# Patient Record
Sex: MALE | Race: BLACK OR AFRICAN AMERICAN | Employment: OTHER
[De-identification: names, ages, dates, MRNs, and addresses within clinical notes are randomized per-mention and may not be internally consistent; named-entity substitution may affect disease eponyms.]

---

## 2023-08-02 ENCOUNTER — APPOINTMENT (OUTPATIENT)
Facility: HOSPITAL | Age: 73
DRG: 871 | End: 2023-08-02
Payer: MEDICARE

## 2023-08-02 ENCOUNTER — HOSPITAL ENCOUNTER (INPATIENT)
Facility: HOSPITAL | Age: 73
LOS: 13 days | Discharge: NURSING, REHAB OR LONG-TERM CARE | DRG: 871 | End: 2023-08-15
Attending: STUDENT IN AN ORGANIZED HEALTH CARE EDUCATION/TRAINING PROGRAM | Admitting: HOSPITALIST
Payer: MEDICARE

## 2023-08-02 DIAGNOSIS — R91.8 LUNG MASS: ICD-10-CM

## 2023-08-02 DIAGNOSIS — J90 PLEURAL EFFUSION: ICD-10-CM

## 2023-08-02 DIAGNOSIS — A41.9 SEVERE SEPSIS (HCC): ICD-10-CM

## 2023-08-02 DIAGNOSIS — R65.20 SEVERE SEPSIS (HCC): ICD-10-CM

## 2023-08-02 DIAGNOSIS — I48.92 ATRIAL FLUTTER, UNSPECIFIED TYPE (HCC): Primary | ICD-10-CM

## 2023-08-02 LAB
ALBUMIN SERPL-MCNC: 2.3 G/DL (ref 3.5–5)
ALBUMIN/GLOB SERPL: 0.4 (ref 1.1–2.2)
ALP SERPL-CCNC: 133 U/L (ref 45–117)
ALT SERPL-CCNC: 13 U/L (ref 12–78)
ANION GAP SERPL CALC-SCNC: 8 MMOL/L (ref 5–15)
AST SERPL W P-5'-P-CCNC: 16 U/L (ref 15–37)
BASE EXCESS BLD CALC-SCNC: 2 MMOL/L
BASOPHILS # BLD: 0 K/UL (ref 0–0.1)
BASOPHILS NFR BLD: 0 % (ref 0–1)
BILIRUB SERPL-MCNC: 0.6 MG/DL (ref 0.2–1)
BNP SERPL-MCNC: 2075 PG/ML
BUN SERPL-MCNC: 15 MG/DL (ref 6–20)
BUN/CREAT SERPL: 19 (ref 12–20)
CA-I BLD-MCNC: 1.45 MMOL/L (ref 1.12–1.32)
CA-I BLD-MCNC: 11.4 MG/DL (ref 8.5–10.1)
CHLORIDE BLD-SCNC: 104 MMOL/L (ref 98–107)
CHLORIDE SERPL-SCNC: 104 MMOL/L (ref 97–108)
CO2 BLD-SCNC: 26 MMOL/L
CO2 SERPL-SCNC: 26 MMOL/L (ref 21–32)
CREAT SERPL-MCNC: 0.81 MG/DL (ref 0.7–1.3)
CREAT UR-MCNC: 0.49 MG/DL (ref 0.6–1.3)
DIFFERENTIAL METHOD BLD: ABNORMAL
EOSINOPHIL # BLD: 0.2 K/UL (ref 0–0.4)
EOSINOPHIL NFR BLD: 1 % (ref 0–7)
ERYTHROCYTE [DISTWIDTH] IN BLOOD BY AUTOMATED COUNT: 19.4 % (ref 11.5–14.5)
GLOBULIN SER CALC-MCNC: 5.5 G/DL (ref 2–4)
GLUCOSE BLD STRIP.AUTO-MCNC: 106 MG/DL (ref 65–100)
GLUCOSE SERPL-MCNC: 115 MG/DL (ref 65–100)
HCO3 BLD-SCNC: 25.7 MMOL/L (ref 19–28)
HCT VFR BLD AUTO: 36.4 % (ref 36.6–50.3)
HGB BLD-MCNC: 10.4 G/DL (ref 12.1–17)
IMM GRANULOCYTES # BLD AUTO: 0.2 K/UL (ref 0–0.04)
IMM GRANULOCYTES NFR BLD AUTO: 1 % (ref 0–0.5)
LACTATE BLD-SCNC: 1.71 MMOL/L (ref 0.4–2)
LACTATE BLD-SCNC: 2.92 MMOL/L (ref 0.4–2)
LYMPHOCYTES # BLD: 2 K/UL (ref 0.8–3.5)
LYMPHOCYTES NFR BLD: 11 % (ref 12–49)
MCH RBC QN AUTO: 20.5 PG (ref 26–34)
MCHC RBC AUTO-ENTMCNC: 28.6 G/DL (ref 30–36.5)
MCV RBC AUTO: 71.7 FL (ref 80–99)
MONOCYTES # BLD: 1.3 K/UL (ref 0–1)
MONOCYTES NFR BLD: 7 % (ref 5–13)
NEUTS SEG # BLD: 14.6 K/UL (ref 1.8–8)
NEUTS SEG NFR BLD: 80 % (ref 32–75)
NRBC # BLD: 0 K/UL (ref 0–0.01)
NRBC BLD-RTO: 0 PER 100 WBC
PCO2 BLD: 36.2 MMHG (ref 35–45)
PERFORMED BY:: ABNORMAL
PERFORMED BY:: NORMAL
PH BLD: 7.46 (ref 7.35–7.45)
PLATELET # BLD AUTO: 572 K/UL (ref 150–400)
PMV BLD AUTO: 9.5 FL (ref 8.9–12.9)
PO2 BLD: 25 MMHG (ref 75–100)
POTASSIUM BLD-SCNC: 4.1 MMOL/L (ref 3.5–5.5)
POTASSIUM SERPL-SCNC: 3.9 MMOL/L (ref 3.5–5.1)
PROCALCITONIN SERPL-MCNC: 0.07 NG/ML
PROT SERPL-MCNC: 7.8 G/DL (ref 6.4–8.2)
RBC # BLD AUTO: 5.08 M/UL (ref 4.1–5.7)
RBC MORPH BLD: ABNORMAL
SAO2 % BLD: 50 %
SARS-COV-2 RDRP RESP QL NAA+PROBE: NOT DETECTED
SERVICE CMNT-IMP: ABNORMAL
SODIUM BLD-SCNC: 137 MMOL/L (ref 136–145)
SODIUM SERPL-SCNC: 138 MMOL/L (ref 136–145)
SPECIMEN SITE: ABNORMAL
TROPONIN I SERPL HS-MCNC: 29 NG/L (ref 0–76)
WBC # BLD AUTO: 18.3 K/UL (ref 4.1–11.1)

## 2023-08-02 PROCEDURE — 96365 THER/PROPH/DIAG IV INF INIT: CPT

## 2023-08-02 PROCEDURE — 87635 SARS-COV-2 COVID-19 AMP PRB: CPT

## 2023-08-02 PROCEDURE — 80307 DRUG TEST PRSMV CHEM ANLYZR: CPT

## 2023-08-02 PROCEDURE — 84132 ASSAY OF SERUM POTASSIUM: CPT

## 2023-08-02 PROCEDURE — 36415 COLL VENOUS BLD VENIPUNCTURE: CPT

## 2023-08-02 PROCEDURE — 2580000003 HC RX 258: Performed by: HOSPITALIST

## 2023-08-02 PROCEDURE — 87040 BLOOD CULTURE FOR BACTERIA: CPT

## 2023-08-02 PROCEDURE — 2580000003 HC RX 258: Performed by: STUDENT IN AN ORGANIZED HEALTH CARE EDUCATION/TRAINING PROGRAM

## 2023-08-02 PROCEDURE — 80053 COMPREHEN METABOLIC PANEL: CPT

## 2023-08-02 PROCEDURE — 2000000000 HC ICU R&B

## 2023-08-02 PROCEDURE — 6370000000 HC RX 637 (ALT 250 FOR IP): Performed by: STUDENT IN AN ORGANIZED HEALTH CARE EDUCATION/TRAINING PROGRAM

## 2023-08-02 PROCEDURE — 84484 ASSAY OF TROPONIN QUANT: CPT

## 2023-08-02 PROCEDURE — 82803 BLOOD GASES ANY COMBINATION: CPT

## 2023-08-02 PROCEDURE — 82330 ASSAY OF CALCIUM: CPT

## 2023-08-02 PROCEDURE — 70450 CT HEAD/BRAIN W/O DYE: CPT

## 2023-08-02 PROCEDURE — 85025 COMPLETE CBC W/AUTO DIFF WBC: CPT

## 2023-08-02 PROCEDURE — 84295 ASSAY OF SERUM SODIUM: CPT

## 2023-08-02 PROCEDURE — 96375 TX/PRO/DX INJ NEW DRUG ADDON: CPT

## 2023-08-02 PROCEDURE — 93005 ELECTROCARDIOGRAM TRACING: CPT | Performed by: STUDENT IN AN ORGANIZED HEALTH CARE EDUCATION/TRAINING PROGRAM

## 2023-08-02 PROCEDURE — 6360000002 HC RX W HCPCS: Performed by: STUDENT IN AN ORGANIZED HEALTH CARE EDUCATION/TRAINING PROGRAM

## 2023-08-02 PROCEDURE — 81001 URINALYSIS AUTO W/SCOPE: CPT

## 2023-08-02 PROCEDURE — 83880 ASSAY OF NATRIURETIC PEPTIDE: CPT

## 2023-08-02 PROCEDURE — 83605 ASSAY OF LACTIC ACID: CPT

## 2023-08-02 PROCEDURE — 71045 X-RAY EXAM CHEST 1 VIEW: CPT

## 2023-08-02 PROCEDURE — 2500000003 HC RX 250 WO HCPCS: Performed by: STUDENT IN AN ORGANIZED HEALTH CARE EDUCATION/TRAINING PROGRAM

## 2023-08-02 PROCEDURE — 99285 EMERGENCY DEPT VISIT HI MDM: CPT

## 2023-08-02 PROCEDURE — 96361 HYDRATE IV INFUSION ADD-ON: CPT

## 2023-08-02 PROCEDURE — 84145 PROCALCITONIN (PCT): CPT

## 2023-08-02 PROCEDURE — 82947 ASSAY GLUCOSE BLOOD QUANT: CPT

## 2023-08-02 PROCEDURE — 71250 CT THORAX DX C-: CPT

## 2023-08-02 RX ORDER — ENOXAPARIN SODIUM 100 MG/ML
40 INJECTION SUBCUTANEOUS DAILY
Status: DISCONTINUED | OUTPATIENT
Start: 2023-08-03 | End: 2023-08-03

## 2023-08-02 RX ORDER — SODIUM CHLORIDE 9 MG/ML
INJECTION, SOLUTION INTRAVENOUS CONTINUOUS
Status: DISPENSED | OUTPATIENT
Start: 2023-08-02 | End: 2023-08-03

## 2023-08-02 RX ORDER — DILTIAZEM HYDROCHLORIDE 5 MG/ML
0.25 INJECTION INTRAVENOUS ONCE
Status: DISCONTINUED | OUTPATIENT
Start: 2023-08-02 | End: 2023-08-02

## 2023-08-02 RX ORDER — DILTIAZEM HYDROCHLORIDE 5 MG/ML
0.25 INJECTION INTRAVENOUS ONCE
Status: COMPLETED | OUTPATIENT
Start: 2023-08-02 | End: 2023-08-02

## 2023-08-02 RX ORDER — ACETAMINOPHEN 650 MG/1
650 SUPPOSITORY RECTAL EVERY 6 HOURS PRN
Status: DISCONTINUED | OUTPATIENT
Start: 2023-08-02 | End: 2023-08-15 | Stop reason: HOSPADM

## 2023-08-02 RX ORDER — 0.9 % SODIUM CHLORIDE 0.9 %
1000 INTRAVENOUS SOLUTION INTRAVENOUS ONCE
Status: COMPLETED | OUTPATIENT
Start: 2023-08-02 | End: 2023-08-02

## 2023-08-02 RX ORDER — ACETAMINOPHEN 325 MG/1
650 TABLET ORAL EVERY 6 HOURS PRN
Status: DISCONTINUED | OUTPATIENT
Start: 2023-08-02 | End: 2023-08-15 | Stop reason: HOSPADM

## 2023-08-02 RX ORDER — ADENOSINE 3 MG/ML
6 INJECTION, SOLUTION INTRAVENOUS ONCE
Status: COMPLETED | OUTPATIENT
Start: 2023-08-02 | End: 2023-08-02

## 2023-08-02 RX ADMIN — PIPERACILLIN AND TAZOBACTAM 4500 MG: 4; .5 INJECTION, POWDER, LYOPHILIZED, FOR SOLUTION INTRAVENOUS at 19:24

## 2023-08-02 RX ADMIN — ADENOSINE 6 MG: 3 INJECTION INTRAVENOUS at 16:38

## 2023-08-02 RX ADMIN — SODIUM CHLORIDE: 9 INJECTION, SOLUTION INTRAVENOUS at 23:12

## 2023-08-02 RX ADMIN — SODIUM CHLORIDE 1000 ML: 9 INJECTION, SOLUTION INTRAVENOUS at 17:00

## 2023-08-02 RX ADMIN — DILTIAZEM HYDROCHLORIDE 20.5 MG: 5 INJECTION INTRAVENOUS at 16:45

## 2023-08-02 RX ADMIN — DILTIAZEM HYDROCHLORIDE 60 MG: 30 TABLET, FILM COATED ORAL at 16:51

## 2023-08-02 RX ADMIN — VANCOMYCIN HYDROCHLORIDE 2000 MG: 1 INJECTION, POWDER, LYOPHILIZED, FOR SOLUTION INTRAVENOUS at 19:58

## 2023-08-02 RX ADMIN — DILTIAZEM HYDROCHLORIDE 2.5 MG/HR: 5 INJECTION, SOLUTION INTRAVENOUS at 22:02

## 2023-08-02 ASSESSMENT — PAIN - FUNCTIONAL ASSESSMENT: PAIN_FUNCTIONAL_ASSESSMENT: NONE - DENIES PAIN

## 2023-08-02 ASSESSMENT — LIFESTYLE VARIABLES
HOW OFTEN DO YOU HAVE A DRINK CONTAINING ALCOHOL: NEVER
HOW MANY STANDARD DRINKS CONTAINING ALCOHOL DO YOU HAVE ON A TYPICAL DAY: PATIENT DOES NOT DRINK

## 2023-08-02 NOTE — ED TRIAGE NOTES
Patient arrives via ems from home after family took out an ECO, patients only complaint is weakness. Family states that patient has been acting oddly for the past week, walking around naked or in soiled clothes.  Ems reported a HR of 160

## 2023-08-02 NOTE — ED PROVIDER NOTES
EMERGENCY DEPARTMENT HISTORY AND PHYSICAL EXAM      Date: 8/2/2023  Patient Name: Bella Montague  MRN: [id number]  [facility], [place]: 1950  Date of evaluation: 8/2/2023  Provider: Marleni Purvis MD     History of Present Illness     Chief Complaint   Patient presents with    Altered Mental Status       History Provided By: Patient    HPI: Bella Montague, 67 y.o. male with past medical history as listed and reviewed below with no known medical history at this time presenting to the ED for altered mental status. Per EMS the patient was put under ECO for altered mental status over the last few days. This was done by the family. Patient denies any acute complaints at this time, denies any chest pain abdominal pain or back pain. Medical History     Past Medical History:  History reviewed. No pertinent past medical history. Past Surgical History:  History reviewed. No pertinent surgical history. Family History:  Family History   Family history unknown: Yes       Social History:  Social History     Tobacco Use    Smoking status: Every Day     Types: Cigarettes    Smokeless tobacco: Never   Substance Use Topics    Alcohol use: Never       Allergies:  No Known Allergies    PCP: None None    Current Medications:   Previous Medications    No medications on file       Review of Systems     Positives and Pertinent negatives as per HPI. Physical Exam     Vitals:  I reviewed the patient's vital signs  Vitals:    08/02/23 2100 08/02/23 2130 08/02/23 2147 08/02/23 2152   BP: 113/75 110/77 110/83 103/79   Pulse: (!) 119 (!) 139 (!) 162 (!) 149   Resp: 17 19 26 25   Temp:       TempSrc:       SpO2: 99% 98%     Weight:       Height:           Physical Exam  Vitals and nursing note reviewed. Constitutional:       Appearance: He is ill-appearing. HENT:      Head: Normocephalic.       Nose: Nose normal.      Mouth/Throat:      Mouth: Mucous membranes are moist.   Eyes:      Extraocular Movements: Extraocular movements

## 2023-08-03 ENCOUNTER — APPOINTMENT (OUTPATIENT)
Facility: HOSPITAL | Age: 73
DRG: 871 | End: 2023-08-03
Payer: MEDICARE

## 2023-08-03 ENCOUNTER — APPOINTMENT (OUTPATIENT)
Facility: HOSPITAL | Age: 73
DRG: 871 | End: 2023-08-03
Attending: HOSPITALIST
Payer: MEDICARE

## 2023-08-03 ENCOUNTER — APPOINTMENT (OUTPATIENT)
Facility: HOSPITAL | Age: 73
DRG: 871 | End: 2023-08-03
Attending: INTERNAL MEDICINE
Payer: MEDICARE

## 2023-08-03 LAB
25(OH)D3 SERPL-MCNC: 13.2 NG/ML (ref 30–100)
ALBUMIN SERPL-MCNC: 2 G/DL (ref 3.5–5)
ALBUMIN/GLOB SERPL: 0.4 (ref 1.1–2.2)
ALP SERPL-CCNC: 100 U/L (ref 45–117)
ALT SERPL-CCNC: 10 U/L (ref 12–78)
AMPHET UR QL SCN: NEGATIVE
ANION GAP SERPL CALC-SCNC: 6 MMOL/L (ref 5–15)
APPEARANCE FLD: ABNORMAL
APPEARANCE UR: ABNORMAL
AST SERPL W P-5'-P-CCNC: 20 U/L (ref 15–37)
BACTERIA URNS QL MICRO: NEGATIVE /HPF
BARBITURATES UR QL SCN: NEGATIVE
BASOPHILS # BLD: 0 K/UL (ref 0–0.1)
BASOPHILS NFR BLD: 0 % (ref 0–1)
BENZODIAZ UR QL: NEGATIVE
BILIRUB SERPL-MCNC: 0.6 MG/DL (ref 0.2–1)
BILIRUB UR QL: NEGATIVE
BODY FLD TYPE: NORMAL
BUN SERPL-MCNC: 14 MG/DL (ref 6–20)
BUN/CREAT SERPL: 17 (ref 12–20)
CA-I BLD-MCNC: 10.6 MG/DL (ref 8.5–10.1)
CA-I BLD-MCNC: 10.7 MG/DL (ref 8.5–10.1)
CANNABINOIDS UR QL SCN: POSITIVE
CHLORIDE SERPL-SCNC: 109 MMOL/L (ref 97–108)
CK SERPL-CCNC: 40 U/L (ref 39–308)
CO2 SERPL-SCNC: 25 MMOL/L (ref 21–32)
COCAINE UR QL SCN: NEGATIVE
COLOR FLD: ABNORMAL
COLOR UR: YELLOW
CREAT SERPL-MCNC: 0.82 MG/DL (ref 0.7–1.3)
CRP SERPL-MCNC: 8.09 MG/DL (ref 0–0.6)
DIFFERENTIAL METHOD BLD: ABNORMAL
EKG ATRIAL RATE: 167 BPM
EKG ATRIAL RATE: 167 BPM
EKG ATRIAL RATE: 322 BPM
EKG ATRIAL RATE: 332 BPM
EKG DIAGNOSIS: NORMAL
EKG P AXIS: 203 DEGREES
EKG P AXIS: 96 DEGREES
EKG Q-T INTERVAL: 268 MS
EKG Q-T INTERVAL: 278 MS
EKG Q-T INTERVAL: 282 MS
EKG Q-T INTERVAL: 286 MS
EKG QRS DURATION: 114 MS
EKG QRS DURATION: 66 MS
EKG QRS DURATION: 72 MS
EKG QRS DURATION: 80 MS
EKG QTC CALCULATION (BAZETT): 438 MS
EKG QTC CALCULATION (BAZETT): 463 MS
EKG QTC CALCULATION (BAZETT): 468 MS
EKG QTC CALCULATION (BAZETT): 477 MS
EKG R AXIS: 12 DEGREES
EKG R AXIS: 16 DEGREES
EKG R AXIS: 19 DEGREES
EKG R AXIS: 26 DEGREES
EKG T AXIS: -83 DEGREES
EKG T AXIS: 234 DEGREES
EKG T AXIS: 247 DEGREES
EKG T AXIS: 264 DEGREES
EKG VENTRICULAR RATE: 161 BPM
EKG VENTRICULAR RATE: 166 BPM
EKG VENTRICULAR RATE: 167 BPM
EKG VENTRICULAR RATE: 167 BPM
EOSINOPHIL # BLD: 0 K/UL (ref 0–0.4)
EOSINOPHIL NFR BLD: 0 % (ref 0–7)
EOSINOPHIL NFR FLD MANUAL: 0 %
ERYTHROCYTE [DISTWIDTH] IN BLOOD BY AUTOMATED COUNT: 18.9 % (ref 11.5–14.5)
GLOBULIN SER CALC-MCNC: 4.8 G/DL (ref 2–4)
GLUCOSE FLD-MCNC: 53 MG/DL
GLUCOSE SERPL-MCNC: 98 MG/DL (ref 65–100)
GLUCOSE UR STRIP.AUTO-MCNC: NEGATIVE MG/DL
HCT VFR BLD AUTO: 34.7 % (ref 36.6–50.3)
HGB BLD-MCNC: 9.9 G/DL (ref 12.1–17)
HGB UR QL STRIP: NEGATIVE
IMM GRANULOCYTES # BLD AUTO: 0.2 K/UL (ref 0–0.04)
IMM GRANULOCYTES NFR BLD AUTO: 1 % (ref 0–0.5)
KETONES UR QL STRIP.AUTO: NEGATIVE MG/DL
LDH FLD L TO P-CCNC: 359 U/L
LEUKOCYTE ESTERASE UR QL STRIP.AUTO: NEGATIVE
LYMPHOCYTES # BLD: 1.5 K/UL (ref 0.8–3.5)
LYMPHOCYTES NFR BLD: 8 % (ref 12–49)
LYMPHOCYTES NFR FLD: 48 %
Lab: ABNORMAL
MCH RBC QN AUTO: 20.7 PG (ref 26–34)
MCHC RBC AUTO-ENTMCNC: 28.5 G/DL (ref 30–36.5)
MCV RBC AUTO: 72.6 FL (ref 80–99)
MESOTHL CELL NFR FLD: 7 %
METHADONE UR QL: NEGATIVE
MONOCYTES # BLD: 1.1 K/UL (ref 0–1)
MONOCYTES NFR BLD: 6 % (ref 5–13)
MONOCYTES NFR FLD: 3 %
MRSA DNA SPEC QL NAA+PROBE: NOT DETECTED
MUCOUS THREADS URNS QL MICRO: ABNORMAL /LPF
NEUTROPHILS NFR FLD: 42 %
NEUTS BAND # FLD: 0 %
NEUTS SEG # BLD: 15.4 K/UL (ref 1.8–8)
NEUTS SEG NFR BLD: 85 % (ref 32–75)
NITRITE UR QL STRIP.AUTO: NEGATIVE
NRBC # BLD: 0 K/UL (ref 0–0.01)
NRBC BLD-RTO: 0 PER 100 WBC
NUC CELL # FLD: 2123 /CU MM
NUC CELL # FLD: 2123 /CU MM (ref 0–5)
OPIATES UR QL: NEGATIVE
PCP UR QL: NEGATIVE
PH FLD: 8
PH UR STRIP: 5 (ref 5–8)
PLATELET # BLD AUTO: 504 K/UL (ref 150–400)
PMV BLD AUTO: 9.7 FL (ref 8.9–12.9)
POTASSIUM SERPL-SCNC: 4.5 MMOL/L (ref 3.5–5.1)
PROCALCITONIN SERPL-MCNC: 0.07 NG/ML
PROT FLD-MCNC: 3 G/DL
PROT SERPL-MCNC: 6.8 G/DL (ref 6.4–8.2)
PROT UR STRIP-MCNC: NEGATIVE MG/DL
PSA SERPL-MCNC: 5.3 NG/ML (ref 0.01–4)
PTH-INTACT SERPL-MCNC: 9.3 PG/ML (ref 18.4–88)
RBC # BLD AUTO: 4.78 M/UL (ref 4.1–5.7)
RBC # FLD: >100 /CU MM
RBC #/AREA URNS HPF: ABNORMAL /HPF (ref 0–5)
RBC MORPH BLD: ABNORMAL
SODIUM SERPL-SCNC: 140 MMOL/L (ref 136–145)
SP GR UR REFRACTOMETRY: 1.03 (ref 1–1.03)
SPECIMEN SOURCE FLD: ABNORMAL
SPECIMEN SOURCE FLD: NORMAL
TSH SERPL DL<=0.05 MIU/L-ACNC: 2.12 UIU/ML (ref 0.36–3.74)
URATE SERPL-MCNC: 4.9 MG/DL (ref 3.5–7.2)
URINE CULTURE IF INDICATED: ABNORMAL
UROBILINOGEN UR QL STRIP.AUTO: 4 EU/DL (ref 0.1–1)
WBC # BLD AUTO: 18.2 K/UL (ref 4.1–11.1)
WBC URNS QL MICRO: ABNORMAL /HPF (ref 0–4)

## 2023-08-03 PROCEDURE — 85025 COMPLETE CBC W/AUTO DIFF WBC: CPT

## 2023-08-03 PROCEDURE — 6360000002 HC RX W HCPCS: Performed by: HOSPITALIST

## 2023-08-03 PROCEDURE — 02HV33Z INSERTION OF INFUSION DEVICE INTO SUPERIOR VENA CAVA, PERCUTANEOUS APPROACH: ICD-10-PCS | Performed by: STUDENT IN AN ORGANIZED HEALTH CARE EDUCATION/TRAINING PROGRAM

## 2023-08-03 PROCEDURE — 82945 GLUCOSE OTHER FLUID: CPT

## 2023-08-03 PROCEDURE — 6360000004 HC RX CONTRAST MEDICATION: Performed by: INTERNAL MEDICINE

## 2023-08-03 PROCEDURE — 84443 ASSAY THYROID STIM HORMONE: CPT

## 2023-08-03 PROCEDURE — 82550 ASSAY OF CK (CPK): CPT

## 2023-08-03 PROCEDURE — 87205 SMEAR GRAM STAIN: CPT

## 2023-08-03 PROCEDURE — A4216 STERILE WATER/SALINE, 10 ML: HCPCS | Performed by: INTERNAL MEDICINE

## 2023-08-03 PROCEDURE — 87070 CULTURE OTHR SPECIMN AEROBIC: CPT

## 2023-08-03 PROCEDURE — 86140 C-REACTIVE PROTEIN: CPT

## 2023-08-03 PROCEDURE — 84153 ASSAY OF PSA TOTAL: CPT

## 2023-08-03 PROCEDURE — 83970 ASSAY OF PARATHORMONE: CPT

## 2023-08-03 PROCEDURE — 82306 VITAMIN D 25 HYDROXY: CPT

## 2023-08-03 PROCEDURE — 84157 ASSAY OF PROTEIN OTHER: CPT

## 2023-08-03 PROCEDURE — C1894 INTRO/SHEATH, NON-LASER: HCPCS

## 2023-08-03 PROCEDURE — 80053 COMPREHEN METABOLIC PANEL: CPT

## 2023-08-03 PROCEDURE — 89051 BODY FLUID CELL COUNT: CPT

## 2023-08-03 PROCEDURE — 2500000003 HC RX 250 WO HCPCS: Performed by: STUDENT IN AN ORGANIZED HEALTH CARE EDUCATION/TRAINING PROGRAM

## 2023-08-03 PROCEDURE — 83615 LACTATE (LD) (LDH) ENZYME: CPT

## 2023-08-03 PROCEDURE — 76942 ECHO GUIDE FOR BIOPSY: CPT

## 2023-08-03 PROCEDURE — 84145 PROCALCITONIN (PCT): CPT

## 2023-08-03 PROCEDURE — 0W9B3ZZ DRAINAGE OF LEFT PLEURAL CAVITY, PERCUTANEOUS APPROACH: ICD-10-PCS | Performed by: INTERNAL MEDICINE

## 2023-08-03 PROCEDURE — C8929 TTE W OR WO FOL WCON,DOPPLER: HCPCS

## 2023-08-03 PROCEDURE — 87641 MR-STAPH DNA AMP PROBE: CPT

## 2023-08-03 PROCEDURE — 84550 ASSAY OF BLOOD/URIC ACID: CPT

## 2023-08-03 PROCEDURE — 2000000000 HC ICU R&B

## 2023-08-03 PROCEDURE — C1729 CATH, DRAINAGE: HCPCS

## 2023-08-03 PROCEDURE — 2580000003 HC RX 258: Performed by: HOSPITALIST

## 2023-08-03 PROCEDURE — 2580000003 HC RX 258: Performed by: STUDENT IN AN ORGANIZED HEALTH CARE EDUCATION/TRAINING PROGRAM

## 2023-08-03 PROCEDURE — 88305 TISSUE EXAM BY PATHOLOGIST: CPT

## 2023-08-03 PROCEDURE — 71045 X-RAY EXAM CHEST 1 VIEW: CPT

## 2023-08-03 PROCEDURE — 88341 IMHCHEM/IMCYTCHM EA ADD ANTB: CPT

## 2023-08-03 PROCEDURE — 88112 CYTOPATH CELL ENHANCE TECH: CPT

## 2023-08-03 PROCEDURE — 2580000003 HC RX 258: Performed by: INTERNAL MEDICINE

## 2023-08-03 PROCEDURE — 83986 ASSAY PH BODY FLUID NOS: CPT

## 2023-08-03 PROCEDURE — 88342 IMHCHEM/IMCYTCHM 1ST ANTB: CPT

## 2023-08-03 RX ORDER — 0.9 % SODIUM CHLORIDE 0.9 %
1000 INTRAVENOUS SOLUTION INTRAVENOUS ONCE
Status: COMPLETED | OUTPATIENT
Start: 2023-08-03 | End: 2023-08-03

## 2023-08-03 RX ORDER — ENOXAPARIN SODIUM 100 MG/ML
40 INJECTION SUBCUTANEOUS DAILY
Status: DISCONTINUED | OUTPATIENT
Start: 2023-08-04 | End: 2023-08-11

## 2023-08-03 RX ORDER — SODIUM CHLORIDE 0.9 % (FLUSH) 0.9 %
5-40 SYRINGE (ML) INJECTION PRN
Status: DISCONTINUED | OUTPATIENT
Start: 2023-08-03 | End: 2023-08-15 | Stop reason: HOSPADM

## 2023-08-03 RX ORDER — SODIUM CHLORIDE 0.9 % (FLUSH) 0.9 %
5-40 SYRINGE (ML) INJECTION EVERY 12 HOURS SCHEDULED
Status: DISCONTINUED | OUTPATIENT
Start: 2023-08-03 | End: 2023-08-15 | Stop reason: HOSPADM

## 2023-08-03 RX ORDER — SODIUM CHLORIDE 9 MG/ML
INJECTION, SOLUTION INTRAVENOUS PRN
Status: DISCONTINUED | OUTPATIENT
Start: 2023-08-03 | End: 2023-08-15 | Stop reason: HOSPADM

## 2023-08-03 RX ADMIN — SODIUM CHLORIDE, PRESERVATIVE FREE 10 ML: 5 INJECTION INTRAVENOUS at 21:00

## 2023-08-03 RX ADMIN — PERFLUTREN 2 ML: 6.52 INJECTION, SUSPENSION INTRAVENOUS at 16:38

## 2023-08-03 RX ADMIN — SODIUM CHLORIDE: 9 INJECTION, SOLUTION INTRAVENOUS at 09:06

## 2023-08-03 RX ADMIN — PIPERACILLIN SODIUM AND TAZOBACTAM SODIUM 3375 MG: 3; .375 INJECTION, POWDER, LYOPHILIZED, FOR SOLUTION INTRAVENOUS at 09:25

## 2023-08-03 RX ADMIN — SODIUM CHLORIDE 1000 ML: 9 INJECTION, SOLUTION INTRAVENOUS at 01:55

## 2023-08-03 RX ADMIN — PIPERACILLIN SODIUM AND TAZOBACTAM SODIUM 3375 MG: 3; .375 INJECTION, POWDER, LYOPHILIZED, FOR SOLUTION INTRAVENOUS at 17:00

## 2023-08-03 RX ADMIN — PIPERACILLIN SODIUM AND TAZOBACTAM SODIUM 3375 MG: 3; .375 INJECTION, POWDER, LYOPHILIZED, FOR SOLUTION INTRAVENOUS at 02:01

## 2023-08-03 RX ADMIN — DILTIAZEM HYDROCHLORIDE 10 MG/HR: 5 INJECTION, SOLUTION INTRAVENOUS at 15:57

## 2023-08-03 RX ADMIN — SODIUM CHLORIDE, PRESERVATIVE FREE 10 ML: 5 INJECTION INTRAVENOUS at 09:25

## 2023-08-03 ASSESSMENT — PAIN SCALES - GENERAL
PAINLEVEL_OUTOF10: 0
PAINLEVEL_OUTOF10: 0

## 2023-08-03 NOTE — CARE COORDINATION
8/3/23 AMIRA attempted to contact oliva Garcia @ [phone]) - phone went straight to  & unable to leave message - not set up.

## 2023-08-03 NOTE — ED NOTES
Patient HR increased to 160s, BP stable, repeat EKG preformed and ED MD at bedside.  Hospitalist called and orders placed for ivy Landry RN  08/02/23 0802

## 2023-08-03 NOTE — ED NOTES
D19 contacted to inquire about the ECO and was informed that it was released due to the patient being treated for medical issues.  Prescreen is being emailed to [facility] at this time     Balaji Smith RN  08/02/23 2031

## 2023-08-03 NOTE — CARE COORDINATION
Multiple outbound calls to patient's oliva Tyler @ [phone]. No answer and unable to leave a  d/t mailbox not set up. Assigned RN informed writer patient is supposed to have a thoracentesis today. Patient is Aox1 to himself. Was able to inform RN he has two grown kids. Daughter's aren't listed in his chart; and there is no supporting documentation for a POA or Advance Medical Directive. Will continue to try and reach the oliva Tyler) @ [phone].        Heath Lopez, MSBRENDON

## 2023-08-04 ENCOUNTER — APPOINTMENT (OUTPATIENT)
Facility: HOSPITAL | Age: 73
DRG: 871 | End: 2023-08-04
Payer: MEDICARE

## 2023-08-04 LAB
ALBUMIN SERPL-MCNC: 1.9 G/DL (ref 3.5–5)
ALBUMIN/GLOB SERPL: 0.4 (ref 1.1–2.2)
ALP SERPL-CCNC: 96 U/L (ref 45–117)
ALT SERPL-CCNC: 11 U/L (ref 12–78)
ANION GAP SERPL CALC-SCNC: 7 MMOL/L (ref 5–15)
ARTERIAL PATENCY WRIST A: YES
AST SERPL W P-5'-P-CCNC: 13 U/L (ref 15–37)
BASE DEFICIT BLDA-SCNC: 1.2 MMOL/L
BASOPHILS # BLD: 0 K/UL (ref 0–0.1)
BASOPHILS NFR BLD: 0 % (ref 0–1)
BDY SITE: ABNORMAL
BILIRUB SERPL-MCNC: 0.6 MG/DL (ref 0.2–1)
BODY TEMPERATURE: 97.2
BUN SERPL-MCNC: 16 MG/DL (ref 6–20)
BUN/CREAT SERPL: 20 (ref 12–20)
CA-I BLD-MCNC: 10.7 MG/DL (ref 8.5–10.1)
CHLORIDE SERPL-SCNC: 109 MMOL/L (ref 97–108)
CO2 SERPL-SCNC: 24 MMOL/L (ref 21–32)
COHGB MFR BLD: 0.8 % (ref 1–2)
CREAT SERPL-MCNC: 0.8 MG/DL (ref 0.7–1.3)
DIFFERENTIAL METHOD BLD: ABNORMAL
ECHO AO ASC DIAM: 2.8 CM
ECHO AO ASCENDING AORTA INDEX: 1.37 CM/M2
ECHO AO ROOT DIAM: 3.1 CM
ECHO AO ROOT INDEX: 1.52 CM/M2
ECHO AV AREA PEAK VELOCITY: 1.8 CM2
ECHO AV AREA VTI: 1.9 CM2
ECHO AV AREA/BSA PEAK VELOCITY: 0.9 CM2/M2
ECHO AV AREA/BSA VTI: 0.9 CM2/M2
ECHO AV MEAN GRADIENT: 6 MMHG
ECHO AV MEAN VELOCITY: 1.1 M/S
ECHO AV PEAK GRADIENT: 7 MMHG
ECHO AV PEAK VELOCITY: 1.3 M/S
ECHO AV VELOCITY RATIO: 0.54
ECHO AV VTI: 19.8 CM
ECHO BSA: 2.04 M2
ECHO LA AREA 2C: 15.8 CM2
ECHO LA AREA 4C: 5.3 CM2
ECHO LA DIAMETER INDEX: 0.93 CM/M2
ECHO LA DIAMETER: 1.9 CM
ECHO LA MAJOR AXIS: 3.6 CM
ECHO LA MINOR AXIS: 4.4 CM
ECHO LA TO AORTIC ROOT RATIO: 0.61
ECHO LA VOL 2C: 46 ML (ref 18–58)
ECHO LA VOL 4C: 6 ML (ref 18–58)
ECHO LA VOL BP: 18 ML (ref 18–58)
ECHO LA VOL/BSA BIPLANE: 9 ML/M2 (ref 16–34)
ECHO LA VOLUME INDEX A2C: 23 ML/M2 (ref 16–34)
ECHO LA VOLUME INDEX A4C: 3 ML/M2 (ref 16–34)
ECHO LV E' LATERAL VELOCITY: 4 CM/S
ECHO LV E' SEPTAL VELOCITY: 4 CM/S
ECHO LV EDV A2C: 69 ML
ECHO LV EDV A4C: 47 ML
ECHO LV EDV INDEX A4C: 23 ML/M2
ECHO LV EDV NDEX A2C: 34 ML/M2
ECHO LV EJECTION FRACTION A2C: 45 %
ECHO LV EJECTION FRACTION A4C: 44 %
ECHO LV EJECTION FRACTION BIPLANE: 47 % (ref 55–100)
ECHO LV ESV A2C: 38 ML
ECHO LV ESV A4C: 26 ML
ECHO LV ESV INDEX A2C: 19 ML/M2
ECHO LV ESV INDEX A4C: 13 ML/M2
ECHO LV FRACTIONAL SHORTENING: 16 % (ref 28–44)
ECHO LV INTERNAL DIMENSION DIASTOLE INDEX: 1.23 CM/M2
ECHO LV INTERNAL DIMENSION DIASTOLIC: 2.5 CM (ref 4.2–5.9)
ECHO LV INTERNAL DIMENSION SYSTOLIC INDEX: 1.03 CM/M2
ECHO LV INTERNAL DIMENSION SYSTOLIC: 2.1 CM
ECHO LV IVSD: 0.9 CM (ref 0.6–1)
ECHO LV MASS 2D: 53.7 G (ref 88–224)
ECHO LV MASS INDEX 2D: 26.3 G/M2 (ref 49–115)
ECHO LV POSTERIOR WALL DIASTOLIC: 0.9 CM (ref 0.6–1)
ECHO LV RELATIVE WALL THICKNESS RATIO: 0.72
ECHO LVOT AREA: 3.5 CM2
ECHO LVOT AV VTI INDEX: 0.55
ECHO LVOT DIAM: 2.1 CM
ECHO LVOT MEAN GRADIENT: 1 MMHG
ECHO LVOT PEAK GRADIENT: 2 MMHG
ECHO LVOT PEAK VELOCITY: 0.7 M/S
ECHO LVOT STROKE VOLUME INDEX: 18.3 ML/M2
ECHO LVOT SV: 37.4 ML
ECHO LVOT VTI: 10.8 CM
ECHO MV AREA VTI: 2.2 CM2
ECHO MV LVOT VTI INDEX: 1.6
ECHO MV MAX VELOCITY: 0.8 M/S
ECHO MV MEAN GRADIENT: 2 MMHG
ECHO MV MEAN VELOCITY: 0.7 M/S
ECHO MV PEAK GRADIENT: 3 MMHG
ECHO MV VTI: 17.3 CM
ECHO PV MAX VELOCITY: 0.5 M/S
ECHO PV PEAK GRADIENT: 1 MMHG
ECHO RA AREA 4C: 11.3 CM2
ECHO RA END SYSTOLIC VOLUME APICAL 4 CHAMBER INDEX BSA: 11 ML/M2
ECHO RA VOLUME: 22 ML
ECHO RV BASAL DIMENSION: 2.5 CM
ECHO RV TAPSE: 1.5 CM (ref 1.7–?)
ECHO TV REGURGITANT MAX VELOCITY: 2.79 M/S
ECHO TV REGURGITANT PEAK GRADIENT: 31 MMHG
EOSINOPHIL # BLD: 0.1 K/UL (ref 0–0.4)
EOSINOPHIL NFR BLD: 0 % (ref 0–7)
ERYTHROCYTE [DISTWIDTH] IN BLOOD BY AUTOMATED COUNT: 18.7 % (ref 11.5–14.5)
FIO2 ON VENT: 21 %
GLOBULIN SER CALC-MCNC: 4.6 G/DL (ref 2–4)
GLUCOSE SERPL-MCNC: 103 MG/DL (ref 65–100)
HCO3 BLDA-SCNC: 23 MMOL/L (ref 22–26)
HCT VFR BLD AUTO: 30.9 % (ref 36.6–50.3)
HGB BLD-MCNC: 9.2 G/DL (ref 12.1–17)
IMM GRANULOCYTES # BLD AUTO: 0.2 K/UL (ref 0–0.04)
IMM GRANULOCYTES NFR BLD AUTO: 1 % (ref 0–0.5)
LYMPHOCYTES # BLD: 2 K/UL (ref 0.8–3.5)
LYMPHOCYTES NFR BLD: 10 % (ref 12–49)
MCH RBC QN AUTO: 21.1 PG (ref 26–34)
MCHC RBC AUTO-ENTMCNC: 29.8 G/DL (ref 30–36.5)
MCV RBC AUTO: 70.9 FL (ref 80–99)
METHGB MFR BLD: 0.3 % (ref 0–1.4)
MONOCYTES # BLD: 1.3 K/UL (ref 0–1)
MONOCYTES NFR BLD: 7 % (ref 5–13)
NEUTS SEG # BLD: 15.6 K/UL (ref 1.8–8)
NEUTS SEG NFR BLD: 82 % (ref 32–75)
NRBC # BLD: 0 K/UL (ref 0–0.01)
NRBC BLD-RTO: 0 PER 100 WBC
OXYHGB MFR BLD: 94.8 % (ref 95–99)
PCO2 BLDA: 33 MMHG (ref 35–45)
PERFORMED BY:: ABNORMAL
PH BLDA: 7.45 (ref 7.35–7.45)
PLATELET # BLD AUTO: 454 K/UL (ref 150–400)
PMV BLD AUTO: 9.8 FL (ref 8.9–12.9)
PO2 BLDA: 78 MMHG (ref 80–100)
POTASSIUM SERPL-SCNC: 3.7 MMOL/L (ref 3.5–5.1)
PROCALCITONIN SERPL-MCNC: 0.07 NG/ML
PROT SERPL-MCNC: 6.5 G/DL (ref 6.4–8.2)
RBC # BLD AUTO: 4.36 M/UL (ref 4.1–5.7)
SAO2 % BLD: 96 % (ref 95–99)
SAO2% DEVICE SAO2% SENSOR NAME: ABNORMAL
SODIUM SERPL-SCNC: 140 MMOL/L (ref 136–145)
SPECIMEN SITE: ABNORMAL
WBC # BLD AUTO: 19.1 K/UL (ref 4.1–11.1)

## 2023-08-04 PROCEDURE — 2580000003 HC RX 258: Performed by: STUDENT IN AN ORGANIZED HEALTH CARE EDUCATION/TRAINING PROGRAM

## 2023-08-04 PROCEDURE — 6360000002 HC RX W HCPCS: Performed by: INTERNAL MEDICINE

## 2023-08-04 PROCEDURE — 82803 BLOOD GASES ANY COMBINATION: CPT

## 2023-08-04 PROCEDURE — 6360000002 HC RX W HCPCS: Performed by: HOSPITALIST

## 2023-08-04 PROCEDURE — 71045 X-RAY EXAM CHEST 1 VIEW: CPT

## 2023-08-04 PROCEDURE — 36600 WITHDRAWAL OF ARTERIAL BLOOD: CPT

## 2023-08-04 PROCEDURE — 85025 COMPLETE CBC W/AUTO DIFF WBC: CPT

## 2023-08-04 PROCEDURE — 84145 PROCALCITONIN (PCT): CPT

## 2023-08-04 PROCEDURE — 36415 COLL VENOUS BLD VENIPUNCTURE: CPT

## 2023-08-04 PROCEDURE — 2580000003 HC RX 258: Performed by: HOSPITALIST

## 2023-08-04 PROCEDURE — 80053 COMPREHEN METABOLIC PANEL: CPT

## 2023-08-04 PROCEDURE — 2000000000 HC ICU R&B

## 2023-08-04 PROCEDURE — 2500000003 HC RX 250 WO HCPCS: Performed by: STUDENT IN AN ORGANIZED HEALTH CARE EDUCATION/TRAINING PROGRAM

## 2023-08-04 RX ADMIN — PIPERACILLIN SODIUM AND TAZOBACTAM SODIUM 3375 MG: 3; .375 INJECTION, POWDER, LYOPHILIZED, FOR SOLUTION INTRAVENOUS at 01:03

## 2023-08-04 RX ADMIN — DILTIAZEM HYDROCHLORIDE 10 MG/HR: 5 INJECTION, SOLUTION INTRAVENOUS at 15:40

## 2023-08-04 RX ADMIN — ENOXAPARIN SODIUM 40 MG: 100 INJECTION SUBCUTANEOUS at 09:11

## 2023-08-04 RX ADMIN — IRON SUCROSE 100 MG: 20 INJECTION, SOLUTION INTRAVENOUS at 20:30

## 2023-08-04 RX ADMIN — DILTIAZEM HYDROCHLORIDE 10 MG/HR: 5 INJECTION, SOLUTION INTRAVENOUS at 03:00

## 2023-08-04 RX ADMIN — SODIUM CHLORIDE, PRESERVATIVE FREE 10 ML: 5 INJECTION INTRAVENOUS at 20:31

## 2023-08-04 RX ADMIN — PIPERACILLIN SODIUM AND TAZOBACTAM SODIUM 3375 MG: 3; .375 INJECTION, POWDER, LYOPHILIZED, FOR SOLUTION INTRAVENOUS at 09:11

## 2023-08-04 RX ADMIN — PIPERACILLIN SODIUM AND TAZOBACTAM SODIUM 3375 MG: 3; .375 INJECTION, POWDER, LYOPHILIZED, FOR SOLUTION INTRAVENOUS at 18:43

## 2023-08-04 RX ADMIN — SODIUM CHLORIDE: 9 INJECTION, SOLUTION INTRAVENOUS at 03:00

## 2023-08-04 RX ADMIN — SODIUM CHLORIDE, PRESERVATIVE FREE 10 ML: 5 INJECTION INTRAVENOUS at 09:10

## 2023-08-04 ASSESSMENT — PAIN SCALES - GENERAL
PAINLEVEL_OUTOF10: 0

## 2023-08-04 NOTE — CARE COORDINATION
23 1002   Service Assessment   Patient Orientation Alert and Oriented   Cognition Alert   History Provided By Child/Family   Primary [facility] [place] Children;Family Members   Patient's Healthcare Decision Maker is: Legal Next of [facility]   PCP Verified by CM No   Prior Functional Level Independent in ADLs/IADLs   Current Functional Level Independent in ADLs/IADLs   Can patient return to prior living arrangement Yes   Ability to make needs known: Fair   Family able to assist with home care needs: Yes   Would you like for me to discuss the discharge plan with any other family members/significant others, and if so, who? Yes   Financial Resources [employer] None   Social/Functional History   Lives With Family; Other (comment)  (Niece)   Type of [address] [place] [address] [address] One level   [address] [facility] [address] to enter with rails   Entrance Stairs - Number of Steps 2   [address] [facility] [place] Help From Family         Met w/patient at the bedside and inquired about marital status \"I'm . \"  Memory impairment. Patient resides in the family home, and his niece Willie Moore) lives w/him. Patient's niece is in her 66's. Discussed him/his siblings to have a discussion re: dad when the niece is not physically able to take care of him anymore. \"We'll make a plan when the time comes. \"     Patient has no established PCP and no pharmacy d/t not being on any medication. Son (Jovana Friedman) voiced \"there are five of us kids. His wife is . \"     Per the son Karina Joyner \"if the recommendation is for SNF send the referral to [facility]. \"      FULL Code. Primary decision maker: Jovana Friedman (son) @ [phone]. Daughter: Brenden Lew @ [phone]. PT/OT eval for therapy recommendation. Insurance will require auth.      Clarke Bee, ALBERTO

## 2023-08-05 ENCOUNTER — APPOINTMENT (OUTPATIENT)
Facility: HOSPITAL | Age: 73
DRG: 871 | End: 2023-08-05
Payer: MEDICARE

## 2023-08-05 LAB
ALBUMIN SERPL-MCNC: 1.7 G/DL (ref 3.5–5)
ALBUMIN/GLOB SERPL: 0.4 (ref 1.1–2.2)
ALP SERPL-CCNC: 88 U/L (ref 45–117)
ALT SERPL-CCNC: 11 U/L (ref 12–78)
ANION GAP SERPL CALC-SCNC: 5 MMOL/L (ref 5–15)
AST SERPL W P-5'-P-CCNC: 12 U/L (ref 15–37)
BACTERIA SPEC CULT: NORMAL
BACTERIA SPEC CULT: NORMAL
BASOPHILS # BLD: 0.1 K/UL (ref 0–0.1)
BASOPHILS NFR BLD: 0 % (ref 0–1)
BILIRUB SERPL-MCNC: 0.4 MG/DL (ref 0.2–1)
BUN SERPL-MCNC: 16 MG/DL (ref 6–20)
BUN/CREAT SERPL: 22 (ref 12–20)
CA-I BLD-MCNC: 10.2 MG/DL (ref 8.5–10.1)
CHLORIDE SERPL-SCNC: 109 MMOL/L (ref 97–108)
CO2 SERPL-SCNC: 24 MMOL/L (ref 21–32)
CREAT SERPL-MCNC: 0.74 MG/DL (ref 0.7–1.3)
DIFFERENTIAL METHOD BLD: ABNORMAL
EOSINOPHIL # BLD: 0.1 K/UL (ref 0–0.4)
EOSINOPHIL NFR BLD: 1 % (ref 0–7)
ERYTHROCYTE [DISTWIDTH] IN BLOOD BY AUTOMATED COUNT: 18.9 % (ref 11.5–14.5)
GLOBULIN SER CALC-MCNC: 4.3 G/DL (ref 2–4)
GLUCOSE BLD STRIP.AUTO-MCNC: 107 MG/DL (ref 65–100)
GLUCOSE BLD STRIP.AUTO-MCNC: 114 MG/DL (ref 65–100)
GLUCOSE SERPL-MCNC: 114 MG/DL (ref 65–100)
GRAM STN SPEC: NORMAL
GRAM STN SPEC: NORMAL
HCT VFR BLD AUTO: 28.8 % (ref 36.6–50.3)
HGB BLD-MCNC: 8.4 G/DL (ref 12.1–17)
IMM GRANULOCYTES # BLD AUTO: 0.2 K/UL (ref 0–0.04)
IMM GRANULOCYTES NFR BLD AUTO: 1 % (ref 0–0.5)
LYMPHOCYTES # BLD: 1.7 K/UL (ref 0.8–3.5)
LYMPHOCYTES NFR BLD: 10 % (ref 12–49)
Lab: NORMAL
MCH RBC QN AUTO: 21 PG (ref 26–34)
MCHC RBC AUTO-ENTMCNC: 29.2 G/DL (ref 30–36.5)
MCV RBC AUTO: 72 FL (ref 80–99)
MONOCYTES # BLD: 1.3 K/UL (ref 0–1)
MONOCYTES NFR BLD: 8 % (ref 5–13)
NEUTS SEG # BLD: 13.2 K/UL (ref 1.8–8)
NEUTS SEG NFR BLD: 80 % (ref 32–75)
NRBC # BLD: 0 K/UL (ref 0–0.01)
NRBC BLD-RTO: 0 PER 100 WBC
PERFORMED BY:: ABNORMAL
PERFORMED BY:: ABNORMAL
PLATELET # BLD AUTO: 390 K/UL (ref 150–400)
PMV BLD AUTO: 9.9 FL (ref 8.9–12.9)
POTASSIUM SERPL-SCNC: 3.8 MMOL/L (ref 3.5–5.1)
PROCALCITONIN SERPL-MCNC: <0.05 NG/ML
PROT SERPL-MCNC: 6 G/DL (ref 6.4–8.2)
RBC # BLD AUTO: 4 M/UL (ref 4.1–5.7)
SODIUM SERPL-SCNC: 138 MMOL/L (ref 136–145)
WBC # BLD AUTO: 16.5 K/UL (ref 4.1–11.1)

## 2023-08-05 PROCEDURE — 84145 PROCALCITONIN (PCT): CPT

## 2023-08-05 PROCEDURE — 2580000003 HC RX 258: Performed by: HOSPITALIST

## 2023-08-05 PROCEDURE — 80053 COMPREHEN METABOLIC PANEL: CPT

## 2023-08-05 PROCEDURE — 2500000003 HC RX 250 WO HCPCS: Performed by: STUDENT IN AN ORGANIZED HEALTH CARE EDUCATION/TRAINING PROGRAM

## 2023-08-05 PROCEDURE — 6360000002 HC RX W HCPCS: Performed by: HOSPITALIST

## 2023-08-05 PROCEDURE — 2000000000 HC ICU R&B

## 2023-08-05 PROCEDURE — 6360000002 HC RX W HCPCS: Performed by: INTERNAL MEDICINE

## 2023-08-05 PROCEDURE — 71045 X-RAY EXAM CHEST 1 VIEW: CPT

## 2023-08-05 PROCEDURE — 2580000003 HC RX 258: Performed by: STUDENT IN AN ORGANIZED HEALTH CARE EDUCATION/TRAINING PROGRAM

## 2023-08-05 PROCEDURE — 85025 COMPLETE CBC W/AUTO DIFF WBC: CPT

## 2023-08-05 PROCEDURE — 82962 GLUCOSE BLOOD TEST: CPT

## 2023-08-05 PROCEDURE — 36415 COLL VENOUS BLD VENIPUNCTURE: CPT

## 2023-08-05 RX ADMIN — IRON SUCROSE 100 MG: 20 INJECTION, SOLUTION INTRAVENOUS at 19:54

## 2023-08-05 RX ADMIN — PIPERACILLIN SODIUM AND TAZOBACTAM SODIUM 3375 MG: 3; .375 INJECTION, POWDER, LYOPHILIZED, FOR SOLUTION INTRAVENOUS at 16:46

## 2023-08-05 RX ADMIN — PIPERACILLIN SODIUM AND TAZOBACTAM SODIUM 3375 MG: 3; .375 INJECTION, POWDER, LYOPHILIZED, FOR SOLUTION INTRAVENOUS at 08:49

## 2023-08-05 RX ADMIN — PIPERACILLIN SODIUM AND TAZOBACTAM SODIUM 3375 MG: 3; .375 INJECTION, POWDER, LYOPHILIZED, FOR SOLUTION INTRAVENOUS at 01:46

## 2023-08-05 RX ADMIN — ENOXAPARIN SODIUM 40 MG: 100 INJECTION SUBCUTANEOUS at 08:49

## 2023-08-05 RX ADMIN — DILTIAZEM HYDROCHLORIDE 10 MG/HR: 5 INJECTION, SOLUTION INTRAVENOUS at 16:43

## 2023-08-05 RX ADMIN — SODIUM CHLORIDE, PRESERVATIVE FREE 10 ML: 5 INJECTION INTRAVENOUS at 19:50

## 2023-08-05 RX ADMIN — DILTIAZEM HYDROCHLORIDE 10 MG/HR: 5 INJECTION, SOLUTION INTRAVENOUS at 01:46

## 2023-08-05 RX ADMIN — SODIUM CHLORIDE, PRESERVATIVE FREE 10 ML: 5 INJECTION INTRAVENOUS at 08:54

## 2023-08-05 ASSESSMENT — PAIN SCALES - GENERAL
PAINLEVEL_OUTOF10: 0

## 2023-08-06 LAB
ALBUMIN SERPL-MCNC: 1.9 G/DL (ref 3.5–5)
ALBUMIN/GLOB SERPL: 0.4 (ref 1.1–2.2)
ALP SERPL-CCNC: 91 U/L (ref 45–117)
ALT SERPL-CCNC: 12 U/L (ref 12–78)
ANION GAP SERPL CALC-SCNC: 4 MMOL/L (ref 5–15)
AST SERPL W P-5'-P-CCNC: 20 U/L (ref 15–37)
BASOPHILS # BLD: 0 K/UL (ref 0–0.1)
BASOPHILS NFR BLD: 0 % (ref 0–1)
BILIRUB SERPL-MCNC: 0.4 MG/DL (ref 0.2–1)
BUN SERPL-MCNC: 12 MG/DL (ref 6–20)
BUN/CREAT SERPL: 17 (ref 12–20)
CA-I BLD-MCNC: 10.2 MG/DL (ref 8.5–10.1)
CHLORIDE SERPL-SCNC: 109 MMOL/L (ref 97–108)
CO2 SERPL-SCNC: 26 MMOL/L (ref 21–32)
CREAT SERPL-MCNC: 0.7 MG/DL (ref 0.7–1.3)
DIFFERENTIAL METHOD BLD: ABNORMAL
EOSINOPHIL # BLD: 0.1 K/UL (ref 0–0.4)
EOSINOPHIL NFR BLD: 1 % (ref 0–7)
ERYTHROCYTE [DISTWIDTH] IN BLOOD BY AUTOMATED COUNT: 18.6 % (ref 11.5–14.5)
GLOBULIN SER CALC-MCNC: 4.3 G/DL (ref 2–4)
GLUCOSE SERPL-MCNC: 105 MG/DL (ref 65–100)
HCT VFR BLD AUTO: 29.3 % (ref 36.6–50.3)
HGB BLD-MCNC: 8.6 G/DL (ref 12.1–17)
IMM GRANULOCYTES # BLD AUTO: 0.1 K/UL (ref 0–0.04)
IMM GRANULOCYTES NFR BLD AUTO: 1 % (ref 0–0.5)
LYMPHOCYTES # BLD: 1.7 K/UL (ref 0.8–3.5)
LYMPHOCYTES NFR BLD: 11 % (ref 12–49)
MCH RBC QN AUTO: 20.9 PG (ref 26–34)
MCHC RBC AUTO-ENTMCNC: 29.4 G/DL (ref 30–36.5)
MCV RBC AUTO: 71.3 FL (ref 80–99)
MONOCYTES # BLD: 1.2 K/UL (ref 0–1)
MONOCYTES NFR BLD: 8 % (ref 5–13)
NEUTS SEG # BLD: 12.5 K/UL (ref 1.8–8)
NEUTS SEG NFR BLD: 79 % (ref 32–75)
NRBC # BLD: 0.02 K/UL (ref 0–0.01)
NRBC BLD-RTO: 0.1 PER 100 WBC
PLATELET # BLD AUTO: 374 K/UL (ref 150–400)
PMV BLD AUTO: 9.1 FL (ref 8.9–12.9)
POTASSIUM SERPL-SCNC: 3.9 MMOL/L (ref 3.5–5.1)
PROCALCITONIN SERPL-MCNC: <0.05 NG/ML
PROT SERPL-MCNC: 6.2 G/DL (ref 6.4–8.2)
RBC # BLD AUTO: 4.11 M/UL (ref 4.1–5.7)
SODIUM SERPL-SCNC: 139 MMOL/L (ref 136–145)
WBC # BLD AUTO: 15.6 K/UL (ref 4.1–11.1)

## 2023-08-06 PROCEDURE — 6360000002 HC RX W HCPCS: Performed by: HOSPITALIST

## 2023-08-06 PROCEDURE — 94761 N-INVAS EAR/PLS OXIMETRY MLT: CPT

## 2023-08-06 PROCEDURE — 2580000003 HC RX 258: Performed by: STUDENT IN AN ORGANIZED HEALTH CARE EDUCATION/TRAINING PROGRAM

## 2023-08-06 PROCEDURE — 2500000003 HC RX 250 WO HCPCS: Performed by: STUDENT IN AN ORGANIZED HEALTH CARE EDUCATION/TRAINING PROGRAM

## 2023-08-06 PROCEDURE — 84145 PROCALCITONIN (PCT): CPT

## 2023-08-06 PROCEDURE — 92610 EVALUATE SWALLOWING FUNCTION: CPT

## 2023-08-06 PROCEDURE — 85025 COMPLETE CBC W/AUTO DIFF WBC: CPT

## 2023-08-06 PROCEDURE — 97110 THERAPEUTIC EXERCISES: CPT

## 2023-08-06 PROCEDURE — 97161 PT EVAL LOW COMPLEX 20 MIN: CPT

## 2023-08-06 PROCEDURE — 2000000000 HC ICU R&B

## 2023-08-06 PROCEDURE — 2580000003 HC RX 258: Performed by: HOSPITALIST

## 2023-08-06 PROCEDURE — 80053 COMPREHEN METABOLIC PANEL: CPT

## 2023-08-06 PROCEDURE — 6360000002 HC RX W HCPCS: Performed by: INTERNAL MEDICINE

## 2023-08-06 PROCEDURE — 36415 COLL VENOUS BLD VENIPUNCTURE: CPT

## 2023-08-06 RX ORDER — ENEMA 19; 7 G/133ML; G/133ML
1 ENEMA RECTAL ONCE
Status: COMPLETED | OUTPATIENT
Start: 2023-08-07 | End: 2023-08-07

## 2023-08-06 RX ORDER — SODIUM PHOSPHATE, DIBASIC AND SODIUM PHOSPHATE, MONOBASIC 3.5; 9.5 G/66ML; G/66ML
1 ENEMA RECTAL ONCE
Status: DISCONTINUED | OUTPATIENT
Start: 2023-08-07 | End: 2023-08-06

## 2023-08-06 RX ADMIN — IRON SUCROSE 100 MG: 20 INJECTION, SOLUTION INTRAVENOUS at 18:58

## 2023-08-06 RX ADMIN — ENOXAPARIN SODIUM 40 MG: 100 INJECTION SUBCUTANEOUS at 08:37

## 2023-08-06 RX ADMIN — PIPERACILLIN SODIUM AND TAZOBACTAM SODIUM 3375 MG: 3; .375 INJECTION, POWDER, LYOPHILIZED, FOR SOLUTION INTRAVENOUS at 01:05

## 2023-08-06 RX ADMIN — SODIUM CHLORIDE, PRESERVATIVE FREE 10 ML: 5 INJECTION INTRAVENOUS at 08:37

## 2023-08-06 RX ADMIN — SODIUM CHLORIDE, PRESERVATIVE FREE 10 ML: 5 INJECTION INTRAVENOUS at 19:34

## 2023-08-06 RX ADMIN — DILTIAZEM HYDROCHLORIDE 7.5 MG/HR: 5 INJECTION, SOLUTION INTRAVENOUS at 12:26

## 2023-08-06 ASSESSMENT — PAIN SCALES - GENERAL
PAINLEVEL_OUTOF10: 0

## 2023-08-07 ENCOUNTER — ANESTHESIA EVENT (OUTPATIENT)
Facility: HOSPITAL | Age: 73
DRG: 871 | End: 2023-08-07
Payer: MEDICARE

## 2023-08-07 ENCOUNTER — ANESTHESIA (OUTPATIENT)
Facility: HOSPITAL | Age: 73
DRG: 871 | End: 2023-08-07
Payer: MEDICARE

## 2023-08-07 ENCOUNTER — APPOINTMENT (OUTPATIENT)
Facility: HOSPITAL | Age: 73
DRG: 871 | End: 2023-08-07
Payer: MEDICARE

## 2023-08-07 LAB
ALBUMIN SERPL-MCNC: 1.8 G/DL (ref 3.5–5)
ALBUMIN/GLOB SERPL: 0.4 (ref 1.1–2.2)
ALP SERPL-CCNC: 101 U/L (ref 45–117)
ALT SERPL-CCNC: 15 U/L (ref 12–78)
ANION GAP SERPL CALC-SCNC: 6 MMOL/L (ref 5–15)
AST SERPL W P-5'-P-CCNC: 14 U/L (ref 15–37)
BASOPHILS # BLD: 0.1 K/UL (ref 0–0.1)
BASOPHILS NFR BLD: 0 % (ref 0–1)
BILIRUB SERPL-MCNC: 0.4 MG/DL (ref 0.2–1)
BUN SERPL-MCNC: 10 MG/DL (ref 6–20)
BUN/CREAT SERPL: 19 (ref 12–20)
CA-I BLD-MCNC: 9.9 MG/DL (ref 8.5–10.1)
CHLORIDE SERPL-SCNC: 106 MMOL/L (ref 97–108)
CO2 SERPL-SCNC: 25 MMOL/L (ref 21–32)
CREAT SERPL-MCNC: 0.54 MG/DL (ref 0.7–1.3)
CYTOLOGY-NON GYN: NORMAL
DIFFERENTIAL METHOD BLD: ABNORMAL
EOSINOPHIL # BLD: 0.1 K/UL (ref 0–0.4)
EOSINOPHIL NFR BLD: 1 % (ref 0–7)
ERYTHROCYTE [DISTWIDTH] IN BLOOD BY AUTOMATED COUNT: 19 % (ref 11.5–14.5)
GLOBULIN SER CALC-MCNC: 4.5 G/DL (ref 2–4)
GLUCOSE SERPL-MCNC: 93 MG/DL (ref 65–100)
HCT VFR BLD AUTO: 30 % (ref 36.6–50.3)
HGB BLD-MCNC: 8.9 G/DL (ref 12.1–17)
IMM GRANULOCYTES # BLD AUTO: 0.1 K/UL (ref 0–0.04)
IMM GRANULOCYTES NFR BLD AUTO: 1 % (ref 0–0.5)
LYMPHOCYTES # BLD: 1.6 K/UL (ref 0.8–3.5)
LYMPHOCYTES NFR BLD: 10 % (ref 12–49)
MCH RBC QN AUTO: 21.1 PG (ref 26–34)
MCHC RBC AUTO-ENTMCNC: 29.7 G/DL (ref 30–36.5)
MCV RBC AUTO: 71.3 FL (ref 80–99)
MONOCYTES # BLD: 1.3 K/UL (ref 0–1)
MONOCYTES NFR BLD: 8 % (ref 5–13)
NEUTS SEG # BLD: 12.5 K/UL (ref 1.8–8)
NEUTS SEG NFR BLD: 80 % (ref 32–75)
NRBC # BLD: 0 K/UL (ref 0–0.01)
NRBC BLD-RTO: 0 PER 100 WBC
PLATELET # BLD AUTO: 398 K/UL (ref 150–400)
PMV BLD AUTO: 9.6 FL (ref 8.9–12.9)
POTASSIUM SERPL-SCNC: 3.6 MMOL/L (ref 3.5–5.1)
PROT SERPL-MCNC: 6.3 G/DL (ref 6.4–8.2)
RBC # BLD AUTO: 4.21 M/UL (ref 4.1–5.7)
SODIUM SERPL-SCNC: 137 MMOL/L (ref 136–145)
WBC # BLD AUTO: 15.6 K/UL (ref 4.1–11.1)

## 2023-08-07 PROCEDURE — 85025 COMPLETE CBC W/AUTO DIFF WBC: CPT

## 2023-08-07 PROCEDURE — 2000000000 HC ICU R&B

## 2023-08-07 PROCEDURE — 2500000003 HC RX 250 WO HCPCS: Performed by: STUDENT IN AN ORGANIZED HEALTH CARE EDUCATION/TRAINING PROGRAM

## 2023-08-07 PROCEDURE — 70553 MRI BRAIN STEM W/O & W/DYE: CPT

## 2023-08-07 PROCEDURE — 6360000002 HC RX W HCPCS: Performed by: NURSE ANESTHETIST, CERTIFIED REGISTERED

## 2023-08-07 PROCEDURE — 2580000003 HC RX 258: Performed by: NURSE ANESTHETIST, CERTIFIED REGISTERED

## 2023-08-07 PROCEDURE — 3700000001 HC ADD 15 MINUTES (ANESTHESIA): Performed by: INTERNAL MEDICINE

## 2023-08-07 PROCEDURE — 6360000004 HC RX CONTRAST MEDICATION: Performed by: INTERNAL MEDICINE

## 2023-08-07 PROCEDURE — A9577 INJ MULTIHANCE: HCPCS | Performed by: INTERNAL MEDICINE

## 2023-08-07 PROCEDURE — 3700000000 HC ANESTHESIA ATTENDED CARE: Performed by: INTERNAL MEDICINE

## 2023-08-07 PROCEDURE — 0DJD8ZZ INSPECTION OF LOWER INTESTINAL TRACT, VIA NATURAL OR ARTIFICIAL OPENING ENDOSCOPIC: ICD-10-PCS | Performed by: INTERNAL MEDICINE

## 2023-08-07 PROCEDURE — 2580000003 HC RX 258: Performed by: STUDENT IN AN ORGANIZED HEALTH CARE EDUCATION/TRAINING PROGRAM

## 2023-08-07 PROCEDURE — 36415 COLL VENOUS BLD VENIPUNCTURE: CPT

## 2023-08-07 PROCEDURE — 6370000000 HC RX 637 (ALT 250 FOR IP): Performed by: NURSE PRACTITIONER

## 2023-08-07 PROCEDURE — 3600007512: Performed by: INTERNAL MEDICINE

## 2023-08-07 PROCEDURE — 3600007502: Performed by: INTERNAL MEDICINE

## 2023-08-07 PROCEDURE — 2580000003 HC RX 258: Performed by: HOSPITALIST

## 2023-08-07 PROCEDURE — 2500000003 HC RX 250 WO HCPCS: Performed by: NURSE ANESTHETIST, CERTIFIED REGISTERED

## 2023-08-07 PROCEDURE — 2709999900 HC NON-CHARGEABLE SUPPLY: Performed by: INTERNAL MEDICINE

## 2023-08-07 PROCEDURE — 51798 US URINE CAPACITY MEASURE: CPT

## 2023-08-07 PROCEDURE — 6370000000 HC RX 637 (ALT 250 FOR IP): Performed by: INTERNAL MEDICINE

## 2023-08-07 PROCEDURE — 6360000002 HC RX W HCPCS: Performed by: INTERNAL MEDICINE

## 2023-08-07 PROCEDURE — 80053 COMPREHEN METABOLIC PANEL: CPT

## 2023-08-07 RX ORDER — SODIUM CHLORIDE 9 MG/ML
INJECTION, SOLUTION INTRAVENOUS CONTINUOUS PRN
Status: DISCONTINUED | OUTPATIENT
Start: 2023-08-07 | End: 2023-08-07 | Stop reason: SDUPTHER

## 2023-08-07 RX ADMIN — SODIUM PHOSPHATE 1 ENEMA: 7; 19 ENEMA RECTAL at 08:10

## 2023-08-07 RX ADMIN — IRON SUCROSE 100 MG: 20 INJECTION, SOLUTION INTRAVENOUS at 19:22

## 2023-08-07 RX ADMIN — GADOBENATE DIMEGLUMINE 15 ML: 529 INJECTION, SOLUTION INTRAVENOUS at 19:00

## 2023-08-07 RX ADMIN — DILTIAZEM HYDROCHLORIDE 7.5 MG/HR: 5 INJECTION, SOLUTION INTRAVENOUS at 19:50

## 2023-08-07 RX ADMIN — SODIUM CHLORIDE, PRESERVATIVE FREE 10 ML: 5 INJECTION INTRAVENOUS at 08:19

## 2023-08-07 RX ADMIN — METOPROLOL TARTRATE 25 MG: 25 TABLET, FILM COATED ORAL at 10:50

## 2023-08-07 RX ADMIN — LIDOCAINE HYDROCHLORIDE 60 MG: 20 INJECTION, SOLUTION EPIDURAL; INFILTRATION; INTRACAUDAL; PERINEURAL at 14:21

## 2023-08-07 RX ADMIN — SODIUM CHLORIDE, PRESERVATIVE FREE 10 ML: 5 INJECTION INTRAVENOUS at 19:50

## 2023-08-07 RX ADMIN — DILTIAZEM HYDROCHLORIDE 10 MG/HR: 5 INJECTION, SOLUTION INTRAVENOUS at 07:12

## 2023-08-07 RX ADMIN — METOPROLOL TARTRATE 25 MG: 25 TABLET, FILM COATED ORAL at 19:50

## 2023-08-07 RX ADMIN — SODIUM CHLORIDE: 9 INJECTION, SOLUTION INTRAVENOUS at 14:20

## 2023-08-07 RX ADMIN — PROPOFOL 80 MG: 10 INJECTION, EMULSION INTRAVENOUS at 14:21

## 2023-08-07 ASSESSMENT — PAIN SCALES - GENERAL
PAINLEVEL_OUTOF10: 0

## 2023-08-07 NOTE — ANESTHESIA POSTPROCEDURE EVALUATION
Department of Anesthesiology  Postprocedure Note    Patient: Bella Montague  MRN: [id number]  YOB: 1950  Date of evaluation: 8/7/2023      Procedure Summary     Date: 08/07/23 Room / Location: [facility] ENDO TRAVEL / [facility] ENDOSCOPY    Anesthesia Start: 2723 Anesthesia Stop: 3973    Procedure: SIGMOIDOSCOPY BIOPSY FLEXIBLE Diagnosis:       Diarrhea, unspecified type      (Diarrhea, unspecified type [R19.7])    Surgeons: Abhi Mace MD Responsible Provider: Juan Munoz MD    Anesthesia Type: MAC ASA Status: 4          Anesthesia Type: MAC    America Phase I:      America Phase II:        Anesthesia Post Evaluation    Patient location during evaluation: ICU  Patient participation: complete - patient participated  Level of consciousness: lethargic  Pain score: 0  Airway patency: patent  Nausea & Vomiting: no nausea and no vomiting  Complications: no  Cardiovascular status: hemodynamically stable  Respiratory status: acceptable  Hydration status: stable  Comments: VSS. Report to RN.  Remains on bed  Pain management: adequate

## 2023-08-07 NOTE — CARE COORDINATION
Pending endoscopy and MRI of the brain. Patient is being followed by [address] [facility]. SOFI Munoz RN. PT/OT eval pending. Primary point of contact is the patient's son Marguerite Kirk @ [phone]. Patient has five total adult children.         Yayo Walton, ALBERTO

## 2023-08-08 ENCOUNTER — ANESTHESIA (OUTPATIENT)
Facility: HOSPITAL | Age: 73
End: 2023-08-08
Payer: MEDICARE

## 2023-08-08 ENCOUNTER — ANESTHESIA EVENT (OUTPATIENT)
Facility: HOSPITAL | Age: 73
End: 2023-08-08
Payer: MEDICARE

## 2023-08-08 ENCOUNTER — APPOINTMENT (OUTPATIENT)
Facility: HOSPITAL | Age: 73
DRG: 871 | End: 2023-08-08
Payer: MEDICARE

## 2023-08-08 LAB
ANION GAP SERPL CALC-SCNC: 6 MMOL/L (ref 5–15)
BUN SERPL-MCNC: 11 MG/DL (ref 6–20)
BUN/CREAT SERPL: 17 (ref 12–20)
CA-I BLD-MCNC: 10 MG/DL (ref 8.5–10.1)
CHLORIDE SERPL-SCNC: 107 MMOL/L (ref 97–108)
CO2 SERPL-SCNC: 25 MMOL/L (ref 21–32)
CREAT SERPL-MCNC: 0.63 MG/DL (ref 0.7–1.3)
ERYTHROCYTE [DISTWIDTH] IN BLOOD BY AUTOMATED COUNT: 19.8 % (ref 11.5–14.5)
GLUCOSE SERPL-MCNC: 82 MG/DL (ref 65–100)
HCT VFR BLD AUTO: 29.8 % (ref 36.6–50.3)
HGB BLD-MCNC: 8.6 G/DL (ref 12.1–17)
MCH RBC QN AUTO: 21 PG (ref 26–34)
MCHC RBC AUTO-ENTMCNC: 28.9 G/DL (ref 30–36.5)
MCV RBC AUTO: 72.7 FL (ref 80–99)
NRBC # BLD: 0 K/UL (ref 0–0.01)
NRBC BLD-RTO: 0 PER 100 WBC
PLATELET # BLD AUTO: 353 K/UL (ref 150–400)
PMV BLD AUTO: 9 FL (ref 8.9–12.9)
POTASSIUM SERPL-SCNC: 4 MMOL/L (ref 3.5–5.1)
RBC # BLD AUTO: 4.1 M/UL (ref 4.1–5.7)
SODIUM SERPL-SCNC: 138 MMOL/L (ref 136–145)
WBC # BLD AUTO: 15.7 K/UL (ref 4.1–11.1)

## 2023-08-08 PROCEDURE — 2580000003 HC RX 258: Performed by: STUDENT IN AN ORGANIZED HEALTH CARE EDUCATION/TRAINING PROGRAM

## 2023-08-08 PROCEDURE — 05H833Z INSERTION OF INFUSION DEVICE INTO LEFT AXILLARY VEIN, PERCUTANEOUS APPROACH: ICD-10-PCS | Performed by: INTERNAL MEDICINE

## 2023-08-08 PROCEDURE — 36415 COLL VENOUS BLD VENIPUNCTURE: CPT

## 2023-08-08 PROCEDURE — 3700000000 HC ANESTHESIA ATTENDED CARE: Performed by: INTERNAL MEDICINE

## 2023-08-08 PROCEDURE — 6370000000 HC RX 637 (ALT 250 FOR IP): Performed by: NURSE PRACTITIONER

## 2023-08-08 PROCEDURE — 0B9D8ZX DRAINAGE OF RIGHT MIDDLE LUNG LOBE, VIA NATURAL OR ARTIFICIAL OPENING ENDOSCOPIC, DIAGNOSTIC: ICD-10-PCS | Performed by: INTERNAL MEDICINE

## 2023-08-08 PROCEDURE — 2500000003 HC RX 250 WO HCPCS: Performed by: NURSE ANESTHETIST, CERTIFIED REGISTERED

## 2023-08-08 PROCEDURE — 2580000003 HC RX 258: Performed by: NURSE ANESTHETIST, CERTIFIED REGISTERED

## 2023-08-08 PROCEDURE — 6360000002 HC RX W HCPCS: Performed by: INTERNAL MEDICINE

## 2023-08-08 PROCEDURE — 2580000003 HC RX 258: Performed by: INTERNAL MEDICINE

## 2023-08-08 PROCEDURE — 6360000002 HC RX W HCPCS: Performed by: NURSE ANESTHETIST, CERTIFIED REGISTERED

## 2023-08-08 PROCEDURE — 2500000003 HC RX 250 WO HCPCS: Performed by: STUDENT IN AN ORGANIZED HEALTH CARE EDUCATION/TRAINING PROGRAM

## 2023-08-08 PROCEDURE — 3600007502: Performed by: INTERNAL MEDICINE

## 2023-08-08 PROCEDURE — 80048 BASIC METABOLIC PNL TOTAL CA: CPT

## 2023-08-08 PROCEDURE — 2000000000 HC ICU R&B

## 2023-08-08 PROCEDURE — 85027 COMPLETE CBC AUTOMATED: CPT

## 2023-08-08 PROCEDURE — 76000 FLUOROSCOPY <1 HR PHYS/QHP: CPT

## 2023-08-08 PROCEDURE — 2709999900 HC NON-CHARGEABLE SUPPLY: Performed by: INTERNAL MEDICINE

## 2023-08-08 PROCEDURE — 88112 CYTOPATH CELL ENHANCE TECH: CPT

## 2023-08-08 PROCEDURE — 88305 TISSUE EXAM BY PATHOLOGIST: CPT

## 2023-08-08 PROCEDURE — 3700000001 HC ADD 15 MINUTES (ANESTHESIA): Performed by: INTERNAL MEDICINE

## 2023-08-08 PROCEDURE — 7100000010 HC PHASE II RECOVERY - FIRST 15 MIN: Performed by: INTERNAL MEDICINE

## 2023-08-08 PROCEDURE — 2580000003 HC RX 258: Performed by: HOSPITALIST

## 2023-08-08 PROCEDURE — 51798 US URINE CAPACITY MEASURE: CPT

## 2023-08-08 PROCEDURE — 71045 X-RAY EXAM CHEST 1 VIEW: CPT

## 2023-08-08 PROCEDURE — 0BB78ZX EXCISION OF LEFT MAIN BRONCHUS, VIA NATURAL OR ARTIFICIAL OPENING ENDOSCOPIC, DIAGNOSTIC: ICD-10-PCS | Performed by: INTERNAL MEDICINE

## 2023-08-08 PROCEDURE — 3600007512: Performed by: INTERNAL MEDICINE

## 2023-08-08 PROCEDURE — C9399 UNCLASSIFIED DRUGS OR BIOLOG: HCPCS | Performed by: NURSE ANESTHETIST, CERTIFIED REGISTERED

## 2023-08-08 RX ORDER — SODIUM CHLORIDE, SODIUM LACTATE, POTASSIUM CHLORIDE, CALCIUM CHLORIDE 600; 310; 30; 20 MG/100ML; MG/100ML; MG/100ML; MG/100ML
INJECTION, SOLUTION INTRAVENOUS CONTINUOUS PRN
Status: DISCONTINUED | OUTPATIENT
Start: 2023-08-08 | End: 2023-08-08 | Stop reason: SDUPTHER

## 2023-08-08 RX ORDER — SUCCINYLCHOLINE/SOD CL,ISO/PF 200MG/10ML
SYRINGE (ML) INTRAVENOUS PRN
Status: DISCONTINUED | OUTPATIENT
Start: 2023-08-08 | End: 2023-08-08 | Stop reason: SDUPTHER

## 2023-08-08 RX ORDER — ONDANSETRON 2 MG/ML
INJECTION INTRAMUSCULAR; INTRAVENOUS PRN
Status: DISCONTINUED | OUTPATIENT
Start: 2023-08-08 | End: 2023-08-08 | Stop reason: SDUPTHER

## 2023-08-08 RX ORDER — METOPROLOL TARTRATE 5 MG/5ML
INJECTION INTRAVENOUS PRN
Status: DISCONTINUED | OUTPATIENT
Start: 2023-08-08 | End: 2023-08-08 | Stop reason: SDUPTHER

## 2023-08-08 RX ORDER — ROCURONIUM BROMIDE 10 MG/ML
INJECTION, SOLUTION INTRAVENOUS PRN
Status: DISCONTINUED | OUTPATIENT
Start: 2023-08-08 | End: 2023-08-08 | Stop reason: SDUPTHER

## 2023-08-08 RX ORDER — LIDOCAINE HYDROCHLORIDE 20 MG/ML
INJECTION, SOLUTION EPIDURAL; INFILTRATION; INTRACAUDAL; PERINEURAL PRN
Status: DISCONTINUED | OUTPATIENT
Start: 2023-08-08 | End: 2023-08-08 | Stop reason: SDUPTHER

## 2023-08-08 RX ADMIN — PHENYLEPHRINE HYDROCHLORIDE 100 MCG: 10 INJECTION INTRAVENOUS at 10:36

## 2023-08-08 RX ADMIN — PROPOFOL 70 MG: 10 INJECTION, EMULSION INTRAVENOUS at 10:22

## 2023-08-08 RX ADMIN — DILTIAZEM HYDROCHLORIDE 5 MG/HR: 5 INJECTION, SOLUTION INTRAVENOUS at 17:38

## 2023-08-08 RX ADMIN — METOPROLOL TARTRATE 1 MG: 5 INJECTION, SOLUTION INTRAVENOUS at 10:35

## 2023-08-08 RX ADMIN — METOPROLOL TARTRATE 25 MG: 25 TABLET, FILM COATED ORAL at 20:05

## 2023-08-08 RX ADMIN — SUGAMMADEX 200 MG: 100 INJECTION, SOLUTION INTRAVENOUS at 10:57

## 2023-08-08 RX ADMIN — ROCURONIUM BROMIDE 20 MG: 10 INJECTION, SOLUTION INTRAVENOUS at 10:33

## 2023-08-08 RX ADMIN — LIDOCAINE HYDROCHLORIDE 80 MG: 20 INJECTION, SOLUTION EPIDURAL; INFILTRATION; INTRACAUDAL; PERINEURAL at 10:22

## 2023-08-08 RX ADMIN — SODIUM CHLORIDE, PRESERVATIVE FREE 10 ML: 5 INJECTION INTRAVENOUS at 20:06

## 2023-08-08 RX ADMIN — PHENYLEPHRINE HYDROCHLORIDE 100 MCG: 10 INJECTION INTRAVENOUS at 10:32

## 2023-08-08 RX ADMIN — METOPROLOL TARTRATE 25 MG: 25 TABLET, FILM COATED ORAL at 16:25

## 2023-08-08 RX ADMIN — IRON SUCROSE 100 MG: 20 INJECTION, SOLUTION INTRAVENOUS at 19:55

## 2023-08-08 RX ADMIN — SODIUM CHLORIDE, POTASSIUM CHLORIDE, SODIUM LACTATE AND CALCIUM CHLORIDE: 600; 310; 30; 20 INJECTION, SOLUTION INTRAVENOUS at 10:13

## 2023-08-08 RX ADMIN — PHENYLEPHRINE HYDROCHLORIDE 200 MCG: 10 INJECTION INTRAVENOUS at 10:28

## 2023-08-08 RX ADMIN — ONDANSETRON 4 MG: 2 INJECTION INTRAMUSCULAR; INTRAVENOUS at 11:04

## 2023-08-08 RX ADMIN — METOPROLOL TARTRATE 3 MG: 5 INJECTION, SOLUTION INTRAVENOUS at 10:19

## 2023-08-08 RX ADMIN — Medication 100 MG: at 10:22

## 2023-08-08 RX ADMIN — METOPROLOL TARTRATE 1 MG: 5 INJECTION, SOLUTION INTRAVENOUS at 10:52

## 2023-08-08 RX ADMIN — WATER 1000 MG: 1 INJECTION INTRAMUSCULAR; INTRAVENOUS; SUBCUTANEOUS at 14:38

## 2023-08-08 RX ADMIN — ROCURONIUM BROMIDE 10 MG: 10 INJECTION, SOLUTION INTRAVENOUS at 10:22

## 2023-08-08 ASSESSMENT — PAIN SCALES - GENERAL
PAINLEVEL_OUTOF10: 0

## 2023-08-08 NOTE — PROCEDURES
Bronchoscopy procedure note    Procedure: Diagnostic bronchoscopy, endobronchial biopsy of left upper lobe/, Cytobrushing , Bronchalal lavage  Indication : Left lung mass 7.5x 6.1 cm with left pleural effusion and left lung volume loss and mediastinal adenopathy  Preprocedure diagnosis: Lung cancer  Postprocedure diagnosis: Lung cancer with complete occlusion of left upper lobe bronchus with fungating mass     Procedure:  Procedure was done in endoscopy suite. Informed consent was obtained from patient's son on telephone. Patient was intubated by anesthesia for this procedure. Timeout was done as per hospital policy. Patient was duly identified in the room. Stable for Olympus bronchoscope was introduced through Dr. Bean Chase through the ET tube. Chris Bowen looked sharp. Tip of scope was frozen reducing right mainstem bronchus, right upper, middle and lower lobe bronchi were noted to be normal without any endobronchial lesion. Tip of scope was then withdrawn to ricardo introducing left mainstem bronchus. The fungating mass that was completely occluding the lumen of left upper lobe bronchus. Scope could not be advanced beyond that point. Geoffrey Brantley 4 endobronchial biopsies were done from the edges of the fungating mass. Picture was taken. Mild amount of heme was lavaged with normal saline. This was followed by Cytobrush and of the mass. Procedure was then terminated. Patient tolerated procedure well. Patient was successfully extubated. Minimal bleeding during the procedure. Hemostasis was achieved. A postprocedure chest x-ray was ordered to rule out any pneumothorax. Once this awake then he will be transferred back to ICU. Specimens were sent for cytology and histopathology.     Danielle Núñez MD

## 2023-08-08 NOTE — PROCEDURES
Vascular Access Team Consult Note: received consult for PIV placement for IJ removal.     Ultrasound-guided (USG) PIV placement: see Epic Avatar and EHR flowsheet date for additional vascular access device and procedural information. 20 g 1.75 inch PIV placed with ultrasound-guidance x 1 attempt in right AC vein. Brisk blood return noted, flushed easily with 10 mL NS. Dressed with large, 3M CHG wafer dressing. Needle-free connector and alcohol swab end caps utilized. A second 20 G 1.75 inch PIV placed with ultrasound-guidance x1 attempt in left AV vein. Beaulah Grates blood return noted, flushed easily with 10 ml NS. Dressed with Lareg, 3M CHG wafer dressing. Needle-free connector and alcohol swab end caps utilized. Client tolerated well, no patient complaints. Client continues to benefit from ultrasound-guided PIV placement due to limited suitable veins for USG cannulation, vein depth, and small vein diameters. Primary care nurse, Jason Willson, notified. Please re-enter IP PICC Team Consult in [facility] for any further vascular access needs.      Rivka Gallardo RN

## 2023-08-08 NOTE — ANESTHESIA POSTPROCEDURE EVALUATION
Department of Anesthesiology  Postprocedure Note    Patient: Kiel Livingston  MRN: [id number]  YOB: 1950  Date of evaluation: 8/8/2023      Procedure Summary     Date: 08/08/23 Room / Location: [facility] ENDO 04 / [facility] ENDOSCOPY    Anesthesia Start: 1013 Anesthesia Stop: 1120    Procedure: BRONCHOSCOPY (Bronchus) Diagnosis:       Lung mass      (Lung mass [R91.8])    Surgeons: Humberto Lorenzo MD Responsible Provider: Shawna Sanchez MD    Anesthesia Type: General ASA Status: 4          Anesthesia Type: General    America Phase I:      America Phase II: America Score: 10      Anesthesia Post Evaluation    Patient location during evaluation: PACU  Patient participation: complete - patient participated  Level of consciousness: awake  Airway patency: patent  Nausea & Vomiting: no nausea and no vomiting  Complications: no  Cardiovascular status: hemodynamically stable  Respiratory status: acceptable  Hydration status: euvolemic  Pain management: adequate

## 2023-08-08 NOTE — PROCEDURES
Patient transported EDWIN to endoscopy by endo staff at this time. Report given to RN and anesthesia.

## 2023-08-09 LAB
ANION GAP SERPL CALC-SCNC: 5 MMOL/L (ref 5–15)
BACTERIA SPEC CULT: NORMAL
BACTERIA SPEC CULT: NORMAL
BASOPHILS # BLD: 0 K/UL (ref 0–0.1)
BASOPHILS NFR BLD: 0 % (ref 0–1)
BUN SERPL-MCNC: 12 MG/DL (ref 6–20)
BUN/CREAT SERPL: 19 (ref 12–20)
CA-I BLD-MCNC: 10.2 MG/DL (ref 8.5–10.1)
CHLORIDE SERPL-SCNC: 108 MMOL/L (ref 97–108)
CO2 SERPL-SCNC: 27 MMOL/L (ref 21–32)
CREAT SERPL-MCNC: 0.62 MG/DL (ref 0.7–1.3)
CYTOLOGY-NON GYN: NORMAL
DIFFERENTIAL METHOD BLD: ABNORMAL
EOSINOPHIL # BLD: 0.1 K/UL (ref 0–0.4)
EOSINOPHIL NFR BLD: 1 % (ref 0–7)
ERYTHROCYTE [DISTWIDTH] IN BLOOD BY AUTOMATED COUNT: 20.6 % (ref 11.5–14.5)
GLUCOSE SERPL-MCNC: 86 MG/DL (ref 65–100)
HCT VFR BLD AUTO: 30.8 % (ref 36.6–50.3)
HGB BLD-MCNC: 9 G/DL (ref 12.1–17)
IMM GRANULOCYTES # BLD AUTO: 0.2 K/UL (ref 0–0.04)
IMM GRANULOCYTES NFR BLD AUTO: 1 % (ref 0–0.5)
LYMPHOCYTES # BLD: 1.6 K/UL (ref 0.8–3.5)
LYMPHOCYTES NFR BLD: 9 % (ref 12–49)
Lab: NORMAL
Lab: NORMAL
MCH RBC QN AUTO: 21.2 PG (ref 26–34)
MCHC RBC AUTO-ENTMCNC: 29.2 G/DL (ref 30–36.5)
MCV RBC AUTO: 72.5 FL (ref 80–99)
MONOCYTES # BLD: 1.4 K/UL (ref 0–1)
MONOCYTES NFR BLD: 8 % (ref 5–13)
NEUTS SEG # BLD: 13.9 K/UL (ref 1.8–8)
NEUTS SEG NFR BLD: 81 % (ref 32–75)
NRBC # BLD: 0.02 K/UL (ref 0–0.01)
NRBC BLD-RTO: 0.1 PER 100 WBC
PLATELET # BLD AUTO: 372 K/UL (ref 150–400)
PMV BLD AUTO: 10.5 FL (ref 8.9–12.9)
POTASSIUM SERPL-SCNC: 4.1 MMOL/L (ref 3.5–5.1)
RBC # BLD AUTO: 4.25 M/UL (ref 4.1–5.7)
SODIUM SERPL-SCNC: 140 MMOL/L (ref 136–145)
WBC # BLD AUTO: 17.3 K/UL (ref 4.1–11.1)

## 2023-08-09 PROCEDURE — 2500000003 HC RX 250 WO HCPCS: Performed by: STUDENT IN AN ORGANIZED HEALTH CARE EDUCATION/TRAINING PROGRAM

## 2023-08-09 PROCEDURE — 6360000002 HC RX W HCPCS: Performed by: HOSPITALIST

## 2023-08-09 PROCEDURE — 97530 THERAPEUTIC ACTIVITIES: CPT

## 2023-08-09 PROCEDURE — 2580000003 HC RX 258: Performed by: HOSPITALIST

## 2023-08-09 PROCEDURE — 2580000003 HC RX 258: Performed by: STUDENT IN AN ORGANIZED HEALTH CARE EDUCATION/TRAINING PROGRAM

## 2023-08-09 PROCEDURE — 80048 BASIC METABOLIC PNL TOTAL CA: CPT

## 2023-08-09 PROCEDURE — 36415 COLL VENOUS BLD VENIPUNCTURE: CPT

## 2023-08-09 PROCEDURE — 6370000000 HC RX 637 (ALT 250 FOR IP): Performed by: NURSE PRACTITIONER

## 2023-08-09 PROCEDURE — 85025 COMPLETE CBC W/AUTO DIFF WBC: CPT

## 2023-08-09 PROCEDURE — 6360000002 HC RX W HCPCS: Performed by: INTERNAL MEDICINE

## 2023-08-09 PROCEDURE — 2000000000 HC ICU R&B

## 2023-08-09 PROCEDURE — 2580000003 HC RX 258: Performed by: INTERNAL MEDICINE

## 2023-08-09 PROCEDURE — 97165 OT EVAL LOW COMPLEX 30 MIN: CPT

## 2023-08-09 PROCEDURE — 92526 ORAL FUNCTION THERAPY: CPT

## 2023-08-09 RX ADMIN — METOPROLOL TARTRATE 25 MG: 25 TABLET, FILM COATED ORAL at 20:09

## 2023-08-09 RX ADMIN — SODIUM CHLORIDE, PRESERVATIVE FREE 10 ML: 5 INJECTION INTRAVENOUS at 20:09

## 2023-08-09 RX ADMIN — ENOXAPARIN SODIUM 40 MG: 100 INJECTION SUBCUTANEOUS at 08:04

## 2023-08-09 RX ADMIN — SODIUM CHLORIDE, PRESERVATIVE FREE 10 ML: 5 INJECTION INTRAVENOUS at 08:04

## 2023-08-09 RX ADMIN — METOPROLOL TARTRATE 25 MG: 25 TABLET, FILM COATED ORAL at 17:21

## 2023-08-09 RX ADMIN — METOPROLOL TARTRATE 25 MG: 25 TABLET, FILM COATED ORAL at 08:04

## 2023-08-09 RX ADMIN — DILTIAZEM HYDROCHLORIDE 10 MG/HR: 5 INJECTION, SOLUTION INTRAVENOUS at 16:57

## 2023-08-09 RX ADMIN — WATER 1000 MG: 1 INJECTION INTRAMUSCULAR; INTRAVENOUS; SUBCUTANEOUS at 11:35

## 2023-08-09 NOTE — CARE COORDINATION
Pending PET CT scan outpatient basis. Pending plan re: cancer dx. Primary point of contact is the patient's son Liban Payton @ [phone]. Patient has five total adult children. Recommended for SNF. Will call patient's son to discuss and inform.        ALBERTO Cordero

## 2023-08-10 LAB
ANION GAP SERPL CALC-SCNC: 4 MMOL/L (ref 5–15)
BASOPHILS # BLD: 0.1 K/UL (ref 0–0.1)
BASOPHILS NFR BLD: 0 % (ref 0–1)
BUN SERPL-MCNC: 16 MG/DL (ref 6–20)
BUN/CREAT SERPL: 22 (ref 12–20)
CA-I BLD-MCNC: 10.3 MG/DL (ref 8.5–10.1)
CHLORIDE SERPL-SCNC: 107 MMOL/L (ref 97–108)
CO2 SERPL-SCNC: 28 MMOL/L (ref 21–32)
CREAT SERPL-MCNC: 0.72 MG/DL (ref 0.7–1.3)
DIFFERENTIAL METHOD BLD: ABNORMAL
EOSINOPHIL # BLD: 0.1 K/UL (ref 0–0.4)
EOSINOPHIL NFR BLD: 1 % (ref 0–7)
ERYTHROCYTE [DISTWIDTH] IN BLOOD BY AUTOMATED COUNT: 21.4 % (ref 11.5–14.5)
GLUCOSE SERPL-MCNC: 103 MG/DL (ref 65–100)
HCT VFR BLD AUTO: 31.4 % (ref 36.6–50.3)
HGB BLD-MCNC: 9.1 G/DL (ref 12.1–17)
IMM GRANULOCYTES # BLD AUTO: 0.3 K/UL (ref 0–0.04)
IMM GRANULOCYTES NFR BLD AUTO: 1 % (ref 0–0.5)
LYMPHOCYTES # BLD: 2 K/UL (ref 0.8–3.5)
LYMPHOCYTES NFR BLD: 11 % (ref 12–49)
MAGNESIUM SERPL-MCNC: 2.2 MG/DL (ref 1.6–2.4)
MCH RBC QN AUTO: 21 PG (ref 26–34)
MCHC RBC AUTO-ENTMCNC: 29 G/DL (ref 30–36.5)
MCV RBC AUTO: 72.5 FL (ref 80–99)
MONOCYTES # BLD: 1.5 K/UL (ref 0–1)
MONOCYTES NFR BLD: 8 % (ref 5–13)
NEUTS SEG # BLD: 14.2 K/UL (ref 1.8–8)
NEUTS SEG NFR BLD: 79 % (ref 32–75)
NRBC # BLD: 0.02 K/UL (ref 0–0.01)
NRBC BLD-RTO: 0.1 PER 100 WBC
PLATELET # BLD AUTO: 386 K/UL (ref 150–400)
PMV BLD AUTO: 10.2 FL (ref 8.9–12.9)
POTASSIUM SERPL-SCNC: 4 MMOL/L (ref 3.5–5.1)
RBC # BLD AUTO: 4.33 M/UL (ref 4.1–5.7)
SODIUM SERPL-SCNC: 139 MMOL/L (ref 136–145)
WBC # BLD AUTO: 18.2 K/UL (ref 4.1–11.1)

## 2023-08-10 PROCEDURE — 6370000000 HC RX 637 (ALT 250 FOR IP): Performed by: HOSPITALIST

## 2023-08-10 PROCEDURE — 80048 BASIC METABOLIC PNL TOTAL CA: CPT

## 2023-08-10 PROCEDURE — 2580000003 HC RX 258: Performed by: STUDENT IN AN ORGANIZED HEALTH CARE EDUCATION/TRAINING PROGRAM

## 2023-08-10 PROCEDURE — 6370000000 HC RX 637 (ALT 250 FOR IP): Performed by: INTERNAL MEDICINE

## 2023-08-10 PROCEDURE — 2580000003 HC RX 258: Performed by: INTERNAL MEDICINE

## 2023-08-10 PROCEDURE — 6360000002 HC RX W HCPCS: Performed by: HOSPITALIST

## 2023-08-10 PROCEDURE — 2500000003 HC RX 250 WO HCPCS: Performed by: STUDENT IN AN ORGANIZED HEALTH CARE EDUCATION/TRAINING PROGRAM

## 2023-08-10 PROCEDURE — 85025 COMPLETE CBC W/AUTO DIFF WBC: CPT

## 2023-08-10 PROCEDURE — 6360000002 HC RX W HCPCS: Performed by: NURSE PRACTITIONER

## 2023-08-10 PROCEDURE — 2000000000 HC ICU R&B

## 2023-08-10 PROCEDURE — 6370000000 HC RX 637 (ALT 250 FOR IP): Performed by: NURSE PRACTITIONER

## 2023-08-10 PROCEDURE — 6360000002 HC RX W HCPCS: Performed by: INTERNAL MEDICINE

## 2023-08-10 PROCEDURE — 36415 COLL VENOUS BLD VENIPUNCTURE: CPT

## 2023-08-10 PROCEDURE — 97530 THERAPEUTIC ACTIVITIES: CPT

## 2023-08-10 PROCEDURE — 2580000003 HC RX 258: Performed by: HOSPITALIST

## 2023-08-10 PROCEDURE — 83735 ASSAY OF MAGNESIUM: CPT

## 2023-08-10 RX ORDER — DIGOXIN 0.25 MG/ML
250 INJECTION INTRAMUSCULAR; INTRAVENOUS EVERY 6 HOURS
Status: COMPLETED | OUTPATIENT
Start: 2023-08-10 | End: 2023-08-11

## 2023-08-10 RX ORDER — DIGOXIN 0.25 MG/ML
500 INJECTION INTRAMUSCULAR; INTRAVENOUS ONCE
Status: COMPLETED | OUTPATIENT
Start: 2023-08-10 | End: 2023-08-10

## 2023-08-10 RX ORDER — CHOLECALCIFEROL (VITAMIN D3) 125 MCG
5 CAPSULE ORAL NIGHTLY PRN
Status: DISCONTINUED | OUTPATIENT
Start: 2023-08-10 | End: 2023-08-15 | Stop reason: HOSPADM

## 2023-08-10 RX ORDER — FAMOTIDINE 20 MG/1
20 TABLET, FILM COATED ORAL 2 TIMES DAILY
Status: DISCONTINUED | OUTPATIENT
Start: 2023-08-10 | End: 2023-08-15 | Stop reason: HOSPADM

## 2023-08-10 RX ADMIN — FAMOTIDINE 20 MG: 20 TABLET ORAL at 20:32

## 2023-08-10 RX ADMIN — METOPROLOL TARTRATE 25 MG: 25 TABLET, FILM COATED ORAL at 15:05

## 2023-08-10 RX ADMIN — METOPROLOL TARTRATE 25 MG: 25 TABLET, FILM COATED ORAL at 08:47

## 2023-08-10 RX ADMIN — SODIUM CHLORIDE, PRESERVATIVE FREE 10 ML: 5 INJECTION INTRAVENOUS at 20:33

## 2023-08-10 RX ADMIN — DILTIAZEM HYDROCHLORIDE 10 MG/HR: 5 INJECTION, SOLUTION INTRAVENOUS at 04:15

## 2023-08-10 RX ADMIN — DILTIAZEM HYDROCHLORIDE 10 MG/HR: 5 INJECTION, SOLUTION INTRAVENOUS at 18:21

## 2023-08-10 RX ADMIN — WATER 1000 MG: 1 INJECTION INTRAMUSCULAR; INTRAVENOUS; SUBCUTANEOUS at 11:06

## 2023-08-10 RX ADMIN — SODIUM CHLORIDE, PRESERVATIVE FREE 10 ML: 5 INJECTION INTRAVENOUS at 08:47

## 2023-08-10 RX ADMIN — METOPROLOL TARTRATE 25 MG: 25 TABLET, FILM COATED ORAL at 20:32

## 2023-08-10 RX ADMIN — FAMOTIDINE 20 MG: 20 TABLET ORAL at 11:06

## 2023-08-10 RX ADMIN — Medication 5 MG: at 22:16

## 2023-08-10 RX ADMIN — DIGOXIN 500 MCG: 0.25 INJECTION INTRAMUSCULAR; INTRAVENOUS at 15:05

## 2023-08-10 RX ADMIN — DIGOXIN 250 MCG: 0.25 INJECTION INTRAMUSCULAR; INTRAVENOUS at 20:32

## 2023-08-10 RX ADMIN — ENOXAPARIN SODIUM 40 MG: 100 INJECTION SUBCUTANEOUS at 08:47

## 2023-08-10 ASSESSMENT — PAIN SCALES - GENERAL
PAINLEVEL_OUTOF10: 0
PAINLEVEL_OUTOF10: 0

## 2023-08-11 ENCOUNTER — HOSPITAL ENCOUNTER (OUTPATIENT)
Facility: HOSPITAL | Age: 73
Discharge: HOME | End: 2023-08-14

## 2023-08-11 ENCOUNTER — APPOINTMENT (OUTPATIENT)
Facility: HOSPITAL | Age: 73
DRG: 871 | End: 2023-08-11
Payer: MEDICARE

## 2023-08-11 LAB
ANION GAP SERPL CALC-SCNC: 6 MMOL/L (ref 5–15)
BUN SERPL-MCNC: 12 MG/DL (ref 6–20)
BUN/CREAT SERPL: 21 (ref 12–20)
CA-I BLD-MCNC: 9.8 MG/DL (ref 8.5–10.1)
CHLORIDE SERPL-SCNC: 105 MMOL/L (ref 97–108)
CO2 SERPL-SCNC: 26 MMOL/L (ref 21–32)
CREAT SERPL-MCNC: 0.57 MG/DL (ref 0.7–1.3)
GLUCOSE SERPL-MCNC: 91 MG/DL (ref 65–100)
MAGNESIUM SERPL-MCNC: 2 MG/DL (ref 1.6–2.4)
POTASSIUM SERPL-SCNC: 3.9 MMOL/L (ref 3.5–5.1)
SODIUM SERPL-SCNC: 137 MMOL/L (ref 136–145)

## 2023-08-11 PROCEDURE — 2580000003 HC RX 258: Performed by: INTERNAL MEDICINE

## 2023-08-11 PROCEDURE — 80048 BASIC METABOLIC PNL TOTAL CA: CPT

## 2023-08-11 PROCEDURE — 6370000000 HC RX 637 (ALT 250 FOR IP): Performed by: NURSE PRACTITIONER

## 2023-08-11 PROCEDURE — 83735 ASSAY OF MAGNESIUM: CPT

## 2023-08-11 PROCEDURE — 6360000002 HC RX W HCPCS: Performed by: INTERNAL MEDICINE

## 2023-08-11 PROCEDURE — 71045 X-RAY EXAM CHEST 1 VIEW: CPT

## 2023-08-11 PROCEDURE — 6370000000 HC RX 637 (ALT 250 FOR IP): Performed by: INTERNAL MEDICINE

## 2023-08-11 PROCEDURE — 6360000002 HC RX W HCPCS: Performed by: NURSE PRACTITIONER

## 2023-08-11 PROCEDURE — 97530 THERAPEUTIC ACTIVITIES: CPT

## 2023-08-11 PROCEDURE — 2000000000 HC ICU R&B

## 2023-08-11 PROCEDURE — 2580000003 HC RX 258: Performed by: HOSPITALIST

## 2023-08-11 PROCEDURE — 6370000000 HC RX 637 (ALT 250 FOR IP): Performed by: HOSPITALIST

## 2023-08-11 PROCEDURE — 6360000002 HC RX W HCPCS: Performed by: HOSPITALIST

## 2023-08-11 PROCEDURE — 2580000003 HC RX 258: Performed by: STUDENT IN AN ORGANIZED HEALTH CARE EDUCATION/TRAINING PROGRAM

## 2023-08-11 PROCEDURE — 2500000003 HC RX 250 WO HCPCS: Performed by: STUDENT IN AN ORGANIZED HEALTH CARE EDUCATION/TRAINING PROGRAM

## 2023-08-11 PROCEDURE — 36415 COLL VENOUS BLD VENIPUNCTURE: CPT

## 2023-08-11 RX ORDER — DIGOXIN 250 MCG
250 TABLET ORAL DAILY
Status: DISCONTINUED | OUTPATIENT
Start: 2023-08-11 | End: 2023-08-15 | Stop reason: HOSPADM

## 2023-08-11 RX ORDER — IPRATROPIUM BROMIDE AND ALBUTEROL SULFATE 2.5; .5 MG/3ML; MG/3ML
1 SOLUTION RESPIRATORY (INHALATION) EVERY 4 HOURS PRN
Status: DISCONTINUED | OUTPATIENT
Start: 2023-08-11 | End: 2023-08-15 | Stop reason: HOSPADM

## 2023-08-11 RX ORDER — METOPROLOL TARTRATE 50 MG/1
50 TABLET, FILM COATED ORAL 3 TIMES DAILY
Status: DISCONTINUED | OUTPATIENT
Start: 2023-08-11 | End: 2023-08-15 | Stop reason: HOSPADM

## 2023-08-11 RX ADMIN — ENOXAPARIN SODIUM 40 MG: 100 INJECTION SUBCUTANEOUS at 08:37

## 2023-08-11 RX ADMIN — WATER 40 MG: 1 INJECTION INTRAMUSCULAR; INTRAVENOUS; SUBCUTANEOUS at 12:35

## 2023-08-11 RX ADMIN — METOPROLOL TARTRATE 50 MG: 50 TABLET, FILM COATED ORAL at 20:51

## 2023-08-11 RX ADMIN — DIGOXIN 250 MCG: 0.25 TABLET ORAL at 10:32

## 2023-08-11 RX ADMIN — DIGOXIN 250 MCG: 0.25 INJECTION INTRAMUSCULAR; INTRAVENOUS at 01:20

## 2023-08-11 RX ADMIN — FAMOTIDINE 20 MG: 20 TABLET ORAL at 20:51

## 2023-08-11 RX ADMIN — APIXABAN 5 MG: 5 TABLET, FILM COATED ORAL at 20:51

## 2023-08-11 RX ADMIN — WATER 40 MG: 1 INJECTION INTRAMUSCULAR; INTRAVENOUS; SUBCUTANEOUS at 21:44

## 2023-08-11 RX ADMIN — SODIUM CHLORIDE, PRESERVATIVE FREE 10 ML: 5 INJECTION INTRAVENOUS at 20:52

## 2023-08-11 RX ADMIN — FAMOTIDINE 20 MG: 20 TABLET ORAL at 08:37

## 2023-08-11 RX ADMIN — SODIUM CHLORIDE, PRESERVATIVE FREE 10 ML: 5 INJECTION INTRAVENOUS at 12:37

## 2023-08-11 RX ADMIN — METOPROLOL TARTRATE 25 MG: 25 TABLET, FILM COATED ORAL at 08:37

## 2023-08-11 RX ADMIN — DILTIAZEM HYDROCHLORIDE 10 MG/HR: 5 INJECTION, SOLUTION INTRAVENOUS at 05:51

## 2023-08-11 RX ADMIN — WATER 1000 MG: 1 INJECTION INTRAMUSCULAR; INTRAVENOUS; SUBCUTANEOUS at 12:34

## 2023-08-11 RX ADMIN — Medication 5 MG: at 20:51

## 2023-08-11 RX ADMIN — METOPROLOL TARTRATE 50 MG: 50 TABLET, FILM COATED ORAL at 15:03

## 2023-08-11 ASSESSMENT — PAIN SCALES - GENERAL: PAINLEVEL_OUTOF10: 0

## 2023-08-11 NOTE — CONSULTS
Asked to see pt.  68 yo gentleman, poor historian, admitted for AMS. Workup revealed L lung mass,CT showed L infrahilar mass with mediastinal mass and L SC mass. Bronch: Squamous cell ca. Thoracentesis : path pending. MRI brain negative. Seen by Dr. Diego Pino as well. 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 Mr. Rico Rodríguez needs a PET scan to complete staging. Will follow Mr. Prabhakar as in-patient and schedule him for followup once medically cleared for discharge. Will contact his son Aimee Guardado ; [phone]). But will likely need to have POA or medical proxy set up. Thank you. Wally Schwartz. Full note in [address].
CARDIOLOGY CONSULTATION    REASON FOR CONSULT: Aflutter RVR    REQUESTING PROVIDER: Maribeth Senior MD    CHIEF COMPLAINT:  altered mental status    HISTORY OF PRESENT ILLNESS:  Eunice Lisa is a 67y.o. year-old male with no documented past medical history and is a very poos historian along with AMS who was evaluated today due to atrial flutter with RVR. He is currently on diltiazem drip at 10mg/hr and HR 80-90s. He was admitted 2 days ago under ECO by family for strange behavior/confusion. Labs revealed troponin negative, BNP 2075, WBC 19, hgb 9.2, lactic 2.9. Head CT negative. Chest CT w/ lt pleural effusion s/p thoracentesis removal of 200ml. Left infrahilar mass also seen. On exam, is alert and awake with intermittent confusion. He answers simple questions appropriately. Denies chest pain. Denies shortness of breath but notes some dyspnea on exertion. Records from hospital admission course thus far reviewed. He was admitted on ECO by family due to strange behavior. Telemetry reviewed. INPATIENT MEDICATIONS:  Home medications reviewed.     Current Facility-Administered Medications:     enoxaparin (LOVENOX) injection 40 mg, 40 mg, SubCUTAneous, Daily, Gil Dave MD, 40 mg at 08/04/23 0911    sodium chloride flush 0.9 % injection 5-40 mL, 5-40 mL, IntraVENous, 2 times per day, Gil Dave MD, 10 mL at 08/04/23 0910    sodium chloride flush 0.9 % injection 5-40 mL, 5-40 mL, IntraVENous, PRN, Gil Dave MD    0.9 % sodium chloride infusion, , IntraVENous, PRN, Gil Dave MD, Last Rate: 10 mL/hr at 08/04/23 0300, New Bag at 08/04/23 0300    piperacillin-tazobactam (ZOSYN) 3,375 mg in sodium chloride 0.9 % 50 mL IVPB (mini-bag), 3,375 mg, IntraVENous, Q8H, Gil Dave MD, Last Rate: 12.5 mL/hr at 08/04/23 0911, 3,375 mg at 08/04/23 0911    acetaminophen (TYLENOL) tablet 650 mg, 650 mg, Oral, Q6H PRN **OR** acetaminophen (TYLENOL) suppository 650
Hematology and Oncology Inpatient Consult Note     Patient: Meghna Lee MRN: [id number]  SSN: [id number]    YOB: 1950  Age: 67 y.o. Sex: male    Chief Complaint:    Reason for consult: Lung mass    Subjective:      Meghna Lee is a 67 y.o. male who is being seen for abnormal lung mass shown on the CT scan. 79-year-old male admitted to  the hospital with confusion altered mental status and was brought in by the family for decreased p.o. intake weakness and a change in behavior was noticed by the family and brought him to the hospital yesterday. Upon evaluation in the ER he was noted to have tachycardia and was started on diltiazem drip. He underwent imaging studies and a CT scan of the chest showed a large 7 x 3 cm infrahilar mass on the left lung. Scan also shows enlarged left supraclavicular lymphadenopathy as well as mediastinal lymphadenopathy. A large left effusion is also seen. Rectal wall thickening concerning for neoplasm as well. In view of this oncology is consulted. Patient is awake alert in the ICU has a one-to-one observation sitter. Offers no new complaints today. IR is consulted for thoracentesis. Seen by pulmonary as well. Patient is a poor historian and could not provide much information. No family at bedside      History reviewed. No pertinent past medical history.   Past Surgical History:   Procedure Laterality Date    IR INSERT NON TUNNELED CV CATH LESS THAN 5 YEARS  8/3/2023    IR INSERT NON TUNNELED CV CATH LESS THAN 5 YEARS 8/3/2023 Brandie Guerra, APRN - NP SSR RAD ANGIO IR      Family History   Family history unknown: Yes     Social History     Tobacco Use    Smoking status: Every Day     Types: Cigarettes    Smokeless tobacco: Never   Substance Use Topics    Alcohol use: Never      Current Facility-Administered Medications   Medication Dose Route Frequency Provider Last Rate Last Admin    [START ON 8/4/2023] enoxaparin (LOVENOX) injection 40 mg  40
Please see dictated consult note.
mass  Deconditioning, malnutrition severe,  Sepsis, dehydration  Mental status change  Plan   1.   Continue current sepsis treatment  Follow cytology from paracentesis  May do a flexible sigmoid colonoscopy after enemas Monday morning,  Electronically signed by Morena Sexton MD on 8/4/23 at 2:56 PM EDT
- 5.1 mmol/L    Chloride 109 (H) 97 - 108 mmol/L    CO2 25 21 - 32 mmol/L    Anion Gap 6 5 - 15 mmol/L    Glucose 98 65 - 100 mg/dL    BUN 14 6 - 20 mg/dL    Creatinine 0.82 0.70 - 1.30 mg/dL    Bun/Cre Ratio 17 12 - 20      Est, Glom Filt Rate >60 >60 ml/min/1.73m2    Calcium 10.6 (H) 8.5 - 10.1 mg/dL    Total Bilirubin 0.6 0.2 - 1.0 mg/dL    AST 20 15 - 37 U/L    ALT 10 (L) 12 - 78 U/L    Alk Phosphatase 100 45 - 117 U/L    Total Protein 6.8 6.4 - 8.2 g/dL    Albumin 2.0 (L) 3.5 - 5.0 g/dL    Globulin 4.8 (H) 2.0 - 4.0 g/dL    Albumin/Globulin Ratio 0.4 (L) 1.1 - 2.2     Procalcitonin    Collection Time: 08/03/23  4:25 AM   Result Value Ref Range    Procalcitonin 0.07 (H) 0 ng/mL   CBC with Auto Differential    Collection Time: 08/03/23  4:25 AM   Result Value Ref Range    WBC 18.2 (H) 4.1 - 11.1 K/uL    RBC 4.78 4.10 - 5.70 M/uL    Hemoglobin 9.9 (L) 12.1 - 17.0 g/dL    Hematocrit 34.7 (L) 36.6 - 50.3 %    MCV 72.6 (L) 80.0 - 99.0 FL    MCH 20.7 (L) 26.0 - 34.0 PG    MCHC 28.5 (L) 30.0 - 36.5 g/dL    RDW 18.9 (H) 11.5 - 14.5 %    Platelets 247 (H) 213 - 400 K/uL    MPV 9.7 8.9 - 12.9 FL    Nucleated RBCs 0.0 0.0  WBC    nRBC 0.00 0.00 - 0.01 K/uL    Neutrophils % 85 (H) 32 - 75 %    Lymphocytes % 8 (L) 12 - 49 %    Monocytes % 6 5 - 13 %    Eosinophils % 0 0 - 7 %    Basophils % 0 0 - 1 %    Immature Granulocytes 1 (H) 0 - 0.5 %    Neutrophils Absolute 15.4 (H) 1.8 - 8.0 K/UL    Lymphocytes Absolute 1.5 0.8 - 3.5 K/UL    Monocytes Absolute 1.1 (H) 0.0 - 1.0 K/UL    Eosinophils Absolute 0.0 0.0 - 0.4 K/UL    Basophils Absolute 0.0 0.0 - 0.1 K/UL    Absolute Immature Granulocyte 0.2 (H) 0.00 - 0.04 K/UL    Differential Type AUTOMATED      RBC Comment Microcytosis  1+        RBC Comment Hypochromia  2+        RBC Comment Anisocytosis  1+           CT CHEST ABDOMEN PELVIS WO CONTRAST Additional Contrast? None   Final Result   Large soft tissue abnormality in the left infrahilar lung field is concerning   for
08/03/23  1:15 PM   Result Value Ref Range    Body Surface Area 2.04 m2    LV EDV A2C 69 mL    LV EDV A4C 47 mL    LV ESV A2C 38 mL    LV ESV A4C 26 mL    IVSd 0.9 0.6 - 1.0 cm    LVIDd 2.5 (A) 4.2 - 5.9 cm    LVIDs 2.1 cm    LVOT Diameter 2.1 cm    LVOT Mean Gradient 1 mmHg    LVOT VTI 10.8 cm    LVOT Peak Velocity 0.7 m/s    LVOT Peak Gradient 2 mmHg    LVPWd 0.9 0.6 - 1.0 cm    LV E' Lateral Velocity 4 cm/s    LV E' Septal Velocity 4 cm/s    LV Ejection Fraction A2C 45 %    LV Ejection Fraction A4C 44 %    EF BP 47 (A) 55 - 100 %    LVOT Area 3.5 cm2    LVOT SV 37.4 ml    LA Minor Axis 4.4 cm    LA Major Axis 3.6 cm    LA Area 2C 15.8 cm2    LA Area 4C 5.3 cm2    LA Volume 2C 46 18 - 58 mL    LA Volume 4C 6 (A) 18 - 58 mL    LA Volume BP 18 18 - 58 mL    LA Diameter 1.9 cm    RA Area 4C 11.3 cm2    RA Volume 22 ml    AV Mean Gradient 6 mmHg    AV VTI 19.8 cm    AV Mean Velocity 1.1 m/s    AV Peak Velocity 1.3 m/s    AV Peak Gradient 7 mmHg    AV Area by VTI 1.9 cm2    AV Area by Peak Velocity 1.8 cm2    Aortic Root 3.1 cm    Ascending Aorta 2.8 cm    MV Mean Gradient 2 mmHg    MV VTI 17.3 cm    MV Mean Velocity 0.7 m/s    MV Max Velocity 0.8 m/s    MV Peak Gradient 3 mmHg    MV Area by VTI 2.2 cm2    PV Max Velocity 0.5 m/s    PV Peak Gradient 1 mmHg    RV Basal Dimension 2.5 cm    TAPSE 1.5 (A) 1.7 cm    TR Max Velocity 2.79 m/s    TR Peak Gradient 31 mmHg    Fractional Shortening 2D 16 28 - 44 %    LV ESV Index A4C 13 mL/m2    LV EDV Index A4C 23 mL/m2    LV ESV Index A2C 19 mL/m2    LV EDV Index A2C 34 mL/m2    LVIDd Index 1.23 cm/m2    LVIDs Index 1.03 cm/m2    LV RWT Ratio 0.72     LV Mass 2D 53.7 (A) 88 - 224 g    LV Mass 2D Index 26.3 (A) 49 - 115 g/m2    LA Volume Index BP 9 (A) 16 - 34 ml/m2    LVOT Stroke Volume Index 18.3 mL/m2    LA Volume Index 2C 23 16 - 34 mL/m2    LA Volume Index 4C 3 (A) 16 - 34 mL/m2    LA Size Index 0.93 cm/m2    LA/AO Root Ratio 0.61     RA Volume Index A4C 11 mL/m2    Ao Root

## 2023-08-11 NOTE — CARE COORDINATION
Results pending from thoracentesis. Pending PET CT scan outpatient basis. Pending plan re: cancer dx. Radiation Oncologist (Dr. Zaheer Mcdowell) to contact the patient's son Tere Recinos to discuss plan of care. Primary point of contact is the patient's son Tere Recinos @ [phone]. Patient has five total adult children. Recommended for SNF. Will call patient's son to discuss and inform.        ALBERTO Rosales

## 2023-08-12 ENCOUNTER — APPOINTMENT (OUTPATIENT)
Facility: HOSPITAL | Age: 73
DRG: 871 | End: 2023-08-12
Payer: MEDICARE

## 2023-08-12 LAB
ANION GAP SERPL CALC-SCNC: 3 MMOL/L (ref 5–15)
BUN SERPL-MCNC: 11 MG/DL (ref 6–20)
BUN/CREAT SERPL: 22 (ref 12–20)
CA-I BLD-MCNC: 9.8 MG/DL (ref 8.5–10.1)
CHLORIDE SERPL-SCNC: 107 MMOL/L (ref 97–108)
CO2 SERPL-SCNC: 27 MMOL/L (ref 21–32)
CREAT SERPL-MCNC: 0.5 MG/DL (ref 0.7–1.3)
ERYTHROCYTE [DISTWIDTH] IN BLOOD BY AUTOMATED COUNT: 21.9 % (ref 11.5–14.5)
GLUCOSE SERPL-MCNC: 113 MG/DL (ref 65–100)
HCT VFR BLD AUTO: 30.2 % (ref 36.6–50.3)
HGB BLD-MCNC: 8.8 G/DL (ref 12.1–17)
MAGNESIUM SERPL-MCNC: 2 MG/DL (ref 1.6–2.4)
MCH RBC QN AUTO: 21.6 PG (ref 26–34)
MCHC RBC AUTO-ENTMCNC: 29.1 G/DL (ref 30–36.5)
MCV RBC AUTO: 74 FL (ref 80–99)
NRBC # BLD: 0 K/UL (ref 0–0.01)
NRBC BLD-RTO: 0 PER 100 WBC
PLATELET # BLD AUTO: 328 K/UL (ref 150–400)
PMV BLD AUTO: 9.7 FL (ref 8.9–12.9)
POTASSIUM SERPL-SCNC: 4 MMOL/L (ref 3.5–5.1)
RBC # BLD AUTO: 4.08 M/UL (ref 4.1–5.7)
SODIUM SERPL-SCNC: 137 MMOL/L (ref 136–145)
WBC # BLD AUTO: 13 K/UL (ref 4.1–11.1)

## 2023-08-12 PROCEDURE — 6370000000 HC RX 637 (ALT 250 FOR IP): Performed by: INTERNAL MEDICINE

## 2023-08-12 PROCEDURE — 2580000003 HC RX 258: Performed by: INTERNAL MEDICINE

## 2023-08-12 PROCEDURE — 80048 BASIC METABOLIC PNL TOTAL CA: CPT

## 2023-08-12 PROCEDURE — 6360000002 HC RX W HCPCS: Performed by: INTERNAL MEDICINE

## 2023-08-12 PROCEDURE — 83735 ASSAY OF MAGNESIUM: CPT

## 2023-08-12 PROCEDURE — 2580000003 HC RX 258: Performed by: HOSPITALIST

## 2023-08-12 PROCEDURE — 85027 COMPLETE CBC AUTOMATED: CPT

## 2023-08-12 PROCEDURE — 6370000000 HC RX 637 (ALT 250 FOR IP): Performed by: NURSE PRACTITIONER

## 2023-08-12 PROCEDURE — 71045 X-RAY EXAM CHEST 1 VIEW: CPT

## 2023-08-12 PROCEDURE — 2060000000 HC ICU INTERMEDIATE R&B

## 2023-08-12 PROCEDURE — 36415 COLL VENOUS BLD VENIPUNCTURE: CPT

## 2023-08-12 RX ADMIN — APIXABAN 5 MG: 5 TABLET, FILM COATED ORAL at 09:27

## 2023-08-12 RX ADMIN — SODIUM CHLORIDE, PRESERVATIVE FREE 10 ML: 5 INJECTION INTRAVENOUS at 09:30

## 2023-08-12 RX ADMIN — FAMOTIDINE 20 MG: 20 TABLET ORAL at 20:45

## 2023-08-12 RX ADMIN — DIGOXIN 250 MCG: 0.25 TABLET ORAL at 09:27

## 2023-08-12 RX ADMIN — APIXABAN 5 MG: 5 TABLET, FILM COATED ORAL at 20:45

## 2023-08-12 RX ADMIN — SODIUM CHLORIDE, PRESERVATIVE FREE 10 ML: 5 INJECTION INTRAVENOUS at 20:45

## 2023-08-12 RX ADMIN — WATER 40 MG: 1 INJECTION INTRAMUSCULAR; INTRAVENOUS; SUBCUTANEOUS at 12:11

## 2023-08-12 RX ADMIN — METOPROLOL TARTRATE 50 MG: 50 TABLET, FILM COATED ORAL at 20:45

## 2023-08-12 RX ADMIN — WATER 40 MG: 1 INJECTION INTRAMUSCULAR; INTRAVENOUS; SUBCUTANEOUS at 20:45

## 2023-08-12 RX ADMIN — METOPROLOL TARTRATE 50 MG: 50 TABLET, FILM COATED ORAL at 16:32

## 2023-08-12 RX ADMIN — WATER 40 MG: 1 INJECTION INTRAMUSCULAR; INTRAVENOUS; SUBCUTANEOUS at 02:51

## 2023-08-12 RX ADMIN — WATER 1000 MG: 1 INJECTION INTRAMUSCULAR; INTRAVENOUS; SUBCUTANEOUS at 12:10

## 2023-08-12 RX ADMIN — FAMOTIDINE 20 MG: 20 TABLET ORAL at 09:27

## 2023-08-12 RX ADMIN — METOPROLOL TARTRATE 50 MG: 50 TABLET, FILM COATED ORAL at 09:27

## 2023-08-13 LAB
ALBUMIN SERPL-MCNC: 2 G/DL (ref 3.5–5)
ANION GAP SERPL CALC-SCNC: 5 MMOL/L (ref 5–15)
BNP SERPL-MCNC: 3801 PG/ML
BUN SERPL-MCNC: 17 MG/DL (ref 6–20)
BUN/CREAT SERPL: 30 (ref 12–20)
CA-I BLD-MCNC: 10.1 MG/DL (ref 8.5–10.1)
CHLORIDE SERPL-SCNC: 106 MMOL/L (ref 97–108)
CO2 SERPL-SCNC: 27 MMOL/L (ref 21–32)
CREAT SERPL-MCNC: 0.57 MG/DL (ref 0.7–1.3)
ERYTHROCYTE [DISTWIDTH] IN BLOOD BY AUTOMATED COUNT: 22.4 % (ref 11.5–14.5)
GLUCOSE SERPL-MCNC: 112 MG/DL (ref 65–100)
HCT VFR BLD AUTO: 32.1 % (ref 36.6–50.3)
HGB BLD-MCNC: 9.3 G/DL (ref 12.1–17)
MAGNESIUM SERPL-MCNC: 2.1 MG/DL (ref 1.6–2.4)
MCH RBC QN AUTO: 21.7 PG (ref 26–34)
MCHC RBC AUTO-ENTMCNC: 29 G/DL (ref 30–36.5)
MCV RBC AUTO: 75 FL (ref 80–99)
NRBC # BLD: 0 K/UL (ref 0–0.01)
NRBC BLD-RTO: 0 PER 100 WBC
PHOSPHATE SERPL-MCNC: 2.6 MG/DL (ref 2.6–4.7)
PLATELET # BLD AUTO: 347 K/UL (ref 150–400)
PMV BLD AUTO: 10.4 FL (ref 8.9–12.9)
POTASSIUM SERPL-SCNC: 4.3 MMOL/L (ref 3.5–5.1)
RBC # BLD AUTO: 4.28 M/UL (ref 4.1–5.7)
SODIUM SERPL-SCNC: 138 MMOL/L (ref 136–145)
WBC # BLD AUTO: 19.5 K/UL (ref 4.1–11.1)

## 2023-08-13 PROCEDURE — 83735 ASSAY OF MAGNESIUM: CPT

## 2023-08-13 PROCEDURE — 6370000000 HC RX 637 (ALT 250 FOR IP): Performed by: NURSE PRACTITIONER

## 2023-08-13 PROCEDURE — 85027 COMPLETE CBC AUTOMATED: CPT

## 2023-08-13 PROCEDURE — 6360000002 HC RX W HCPCS: Performed by: INTERNAL MEDICINE

## 2023-08-13 PROCEDURE — 2580000003 HC RX 258: Performed by: INTERNAL MEDICINE

## 2023-08-13 PROCEDURE — 2580000003 HC RX 258: Performed by: HOSPITALIST

## 2023-08-13 PROCEDURE — 80069 RENAL FUNCTION PANEL: CPT

## 2023-08-13 PROCEDURE — 36415 COLL VENOUS BLD VENIPUNCTURE: CPT

## 2023-08-13 PROCEDURE — 6370000000 HC RX 637 (ALT 250 FOR IP): Performed by: INTERNAL MEDICINE

## 2023-08-13 PROCEDURE — 2060000000 HC ICU INTERMEDIATE R&B

## 2023-08-13 PROCEDURE — 83880 ASSAY OF NATRIURETIC PEPTIDE: CPT

## 2023-08-13 RX ADMIN — APIXABAN 5 MG: 5 TABLET, FILM COATED ORAL at 20:41

## 2023-08-13 RX ADMIN — METOPROLOL TARTRATE 50 MG: 50 TABLET, FILM COATED ORAL at 14:49

## 2023-08-13 RX ADMIN — APIXABAN 5 MG: 5 TABLET, FILM COATED ORAL at 09:21

## 2023-08-13 RX ADMIN — METOPROLOL TARTRATE 50 MG: 50 TABLET, FILM COATED ORAL at 09:21

## 2023-08-13 RX ADMIN — SODIUM CHLORIDE, PRESERVATIVE FREE 10 ML: 5 INJECTION INTRAVENOUS at 09:21

## 2023-08-13 RX ADMIN — DIGOXIN 250 MCG: 0.25 TABLET ORAL at 09:21

## 2023-08-13 RX ADMIN — SODIUM CHLORIDE, PRESERVATIVE FREE 10 ML: 5 INJECTION INTRAVENOUS at 20:41

## 2023-08-13 RX ADMIN — METOPROLOL TARTRATE 50 MG: 50 TABLET, FILM COATED ORAL at 20:41

## 2023-08-13 RX ADMIN — WATER 40 MG: 1 INJECTION INTRAMUSCULAR; INTRAVENOUS; SUBCUTANEOUS at 11:49

## 2023-08-13 RX ADMIN — WATER 40 MG: 1 INJECTION INTRAMUSCULAR; INTRAVENOUS; SUBCUTANEOUS at 02:50

## 2023-08-13 RX ADMIN — WATER 1000 MG: 1 INJECTION INTRAMUSCULAR; INTRAVENOUS; SUBCUTANEOUS at 11:49

## 2023-08-13 RX ADMIN — FAMOTIDINE 20 MG: 20 TABLET ORAL at 09:21

## 2023-08-13 RX ADMIN — FAMOTIDINE 20 MG: 20 TABLET ORAL at 20:40

## 2023-08-14 LAB
ALBUMIN SERPL-MCNC: 2 G/DL (ref 3.5–5)
ANION GAP SERPL CALC-SCNC: 6 MMOL/L (ref 5–15)
BUN SERPL-MCNC: 18 MG/DL (ref 6–20)
BUN/CREAT SERPL: 32 (ref 12–20)
CA-I BLD-MCNC: 10.2 MG/DL (ref 8.5–10.1)
CHLORIDE SERPL-SCNC: 104 MMOL/L (ref 97–108)
CO2 SERPL-SCNC: 26 MMOL/L (ref 21–32)
CREAT SERPL-MCNC: 0.56 MG/DL (ref 0.7–1.3)
DIGOXIN SERPL-MCNC: 1.2 NG/ML (ref 0.9–2)
ERYTHROCYTE [DISTWIDTH] IN BLOOD BY AUTOMATED COUNT: 22.4 % (ref 11.5–14.5)
GLUCOSE SERPL-MCNC: 81 MG/DL (ref 65–100)
HCT VFR BLD AUTO: 33.6 % (ref 36.6–50.3)
HGB BLD-MCNC: 9.7 G/DL (ref 12.1–17)
MAGNESIUM SERPL-MCNC: 2.2 MG/DL (ref 1.6–2.4)
MCH RBC QN AUTO: 21.5 PG (ref 26–34)
MCHC RBC AUTO-ENTMCNC: 28.9 G/DL (ref 30–36.5)
MCV RBC AUTO: 74.3 FL (ref 80–99)
NRBC # BLD: 0 K/UL (ref 0–0.01)
NRBC BLD-RTO: 0 PER 100 WBC
PHOSPHATE SERPL-MCNC: 2.2 MG/DL (ref 2.6–4.7)
PLATELET # BLD AUTO: 396 K/UL (ref 150–400)
PMV BLD AUTO: 10.6 FL (ref 8.9–12.9)
POTASSIUM SERPL-SCNC: 4.3 MMOL/L (ref 3.5–5.1)
RBC # BLD AUTO: 4.52 M/UL (ref 4.1–5.7)
SODIUM SERPL-SCNC: 136 MMOL/L (ref 136–145)
WBC # BLD AUTO: 19.7 K/UL (ref 4.1–11.1)

## 2023-08-14 PROCEDURE — 6360000002 HC RX W HCPCS: Performed by: INTERNAL MEDICINE

## 2023-08-14 PROCEDURE — 80162 ASSAY OF DIGOXIN TOTAL: CPT

## 2023-08-14 PROCEDURE — 2060000000 HC ICU INTERMEDIATE R&B

## 2023-08-14 PROCEDURE — 85027 COMPLETE CBC AUTOMATED: CPT

## 2023-08-14 PROCEDURE — 2580000003 HC RX 258: Performed by: INTERNAL MEDICINE

## 2023-08-14 PROCEDURE — 80069 RENAL FUNCTION PANEL: CPT

## 2023-08-14 PROCEDURE — 6370000000 HC RX 637 (ALT 250 FOR IP): Performed by: INTERNAL MEDICINE

## 2023-08-14 PROCEDURE — 83735 ASSAY OF MAGNESIUM: CPT

## 2023-08-14 PROCEDURE — 6370000000 HC RX 637 (ALT 250 FOR IP): Performed by: NURSE PRACTITIONER

## 2023-08-14 PROCEDURE — 97530 THERAPEUTIC ACTIVITIES: CPT

## 2023-08-14 PROCEDURE — 92526 ORAL FUNCTION THERAPY: CPT

## 2023-08-14 PROCEDURE — 36415 COLL VENOUS BLD VENIPUNCTURE: CPT

## 2023-08-14 PROCEDURE — 2580000003 HC RX 258: Performed by: HOSPITALIST

## 2023-08-14 RX ADMIN — DIGOXIN 250 MCG: 0.25 TABLET ORAL at 10:43

## 2023-08-14 RX ADMIN — METOPROLOL TARTRATE 50 MG: 50 TABLET, FILM COATED ORAL at 20:26

## 2023-08-14 RX ADMIN — WATER 1000 MG: 1 INJECTION INTRAMUSCULAR; INTRAVENOUS; SUBCUTANEOUS at 12:03

## 2023-08-14 RX ADMIN — APIXABAN 5 MG: 5 TABLET, FILM COATED ORAL at 20:26

## 2023-08-14 RX ADMIN — SODIUM CHLORIDE, PRESERVATIVE FREE 10 ML: 5 INJECTION INTRAVENOUS at 20:26

## 2023-08-14 RX ADMIN — SODIUM CHLORIDE, PRESERVATIVE FREE 10 ML: 5 INJECTION INTRAVENOUS at 10:44

## 2023-08-14 RX ADMIN — FAMOTIDINE 20 MG: 20 TABLET ORAL at 10:43

## 2023-08-14 RX ADMIN — WATER 40 MG: 1 INJECTION INTRAMUSCULAR; INTRAVENOUS; SUBCUTANEOUS at 11:55

## 2023-08-14 RX ADMIN — METOPROLOL TARTRATE 50 MG: 50 TABLET, FILM COATED ORAL at 10:43

## 2023-08-14 RX ADMIN — APIXABAN 5 MG: 5 TABLET, FILM COATED ORAL at 10:43

## 2023-08-14 RX ADMIN — FAMOTIDINE 20 MG: 20 TABLET ORAL at 20:26

## 2023-08-14 ASSESSMENT — PAIN SCALES - GENERAL: PAINLEVEL_OUTOF10: 0

## 2023-08-14 NOTE — CARE COORDINATION
10:45: CM spoke with patient's son, Tere Recinos ([phone]) to get choice for SNF. He gave choice for SNFs in [facility]. Choice letter completed and referrals sent via [facility]. 15:30: Patient has been accepted to the three SNFs in [facility]. CM spoke with patient's son to get his final choice so that the auth can be initiated. Mr. Reji Sheridan chose [address] [place] [address].

## 2023-08-15 VITALS
OXYGEN SATURATION: 100 % | DIASTOLIC BLOOD PRESSURE: 74 MMHG | WEIGHT: 165.57 LBS | TEMPERATURE: 97.6 F | SYSTOLIC BLOOD PRESSURE: 113 MMHG | RESPIRATION RATE: 18 BRPM | HEIGHT: 72 IN | BODY MASS INDEX: 22.43 KG/M2 | HEART RATE: 83 BPM

## 2023-08-15 LAB
ALBUMIN SERPL-MCNC: 2.1 G/DL (ref 3.5–5)
ANION GAP SERPL CALC-SCNC: 4 MMOL/L (ref 5–15)
BUN SERPL-MCNC: 16 MG/DL (ref 6–20)
BUN/CREAT SERPL: 30 (ref 12–20)
CA-I BLD-MCNC: 10.2 MG/DL (ref 8.5–10.1)
CHLORIDE SERPL-SCNC: 104 MMOL/L (ref 97–108)
CO2 SERPL-SCNC: 30 MMOL/L (ref 21–32)
CREAT SERPL-MCNC: 0.54 MG/DL (ref 0.7–1.3)
ERYTHROCYTE [DISTWIDTH] IN BLOOD BY AUTOMATED COUNT: 22.9 % (ref 11.5–14.5)
GLUCOSE SERPL-MCNC: 75 MG/DL (ref 65–100)
HCT VFR BLD AUTO: 36.3 % (ref 36.6–50.3)
HGB BLD-MCNC: 10.4 G/DL (ref 12.1–17)
MAGNESIUM SERPL-MCNC: 2.2 MG/DL (ref 1.6–2.4)
MCH RBC QN AUTO: 21.5 PG (ref 26–34)
MCHC RBC AUTO-ENTMCNC: 28.7 G/DL (ref 30–36.5)
MCV RBC AUTO: 75 FL (ref 80–99)
NRBC # BLD: 0 K/UL (ref 0–0.01)
NRBC BLD-RTO: 0 PER 100 WBC
PHOSPHATE SERPL-MCNC: 2.5 MG/DL (ref 2.6–4.7)
PLATELET # BLD AUTO: 310 K/UL (ref 150–400)
PMV BLD AUTO: 10.1 FL (ref 8.9–12.9)
POTASSIUM SERPL-SCNC: 4.6 MMOL/L (ref 3.5–5.1)
RBC # BLD AUTO: 4.84 M/UL (ref 4.1–5.7)
SODIUM SERPL-SCNC: 138 MMOL/L (ref 136–145)
WBC # BLD AUTO: 18.9 K/UL (ref 4.1–11.1)

## 2023-08-15 PROCEDURE — 6370000000 HC RX 637 (ALT 250 FOR IP): Performed by: NURSE PRACTITIONER

## 2023-08-15 PROCEDURE — 2580000003 HC RX 258: Performed by: INTERNAL MEDICINE

## 2023-08-15 PROCEDURE — 6360000002 HC RX W HCPCS: Performed by: INTERNAL MEDICINE

## 2023-08-15 PROCEDURE — 83735 ASSAY OF MAGNESIUM: CPT

## 2023-08-15 PROCEDURE — 6370000000 HC RX 637 (ALT 250 FOR IP): Performed by: INTERNAL MEDICINE

## 2023-08-15 PROCEDURE — 85027 COMPLETE CBC AUTOMATED: CPT

## 2023-08-15 PROCEDURE — 36415 COLL VENOUS BLD VENIPUNCTURE: CPT

## 2023-08-15 PROCEDURE — 80069 RENAL FUNCTION PANEL: CPT

## 2023-08-15 PROCEDURE — 2580000003 HC RX 258: Performed by: HOSPITALIST

## 2023-08-15 RX ORDER — DIGOXIN 250 MCG
125 TABLET ORAL DAILY
Qty: 30 TABLET | Refills: 0 | Status: SHIPPED | OUTPATIENT
Start: 2023-08-16

## 2023-08-15 RX ORDER — IPRATROPIUM BROMIDE AND ALBUTEROL SULFATE 2.5; .5 MG/3ML; MG/3ML
3 SOLUTION RESPIRATORY (INHALATION) EVERY 6 HOURS PRN
Qty: 360 ML | Refills: 0 | Status: SHIPPED
Start: 2023-08-15

## 2023-08-15 RX ORDER — PREDNISONE 20 MG/1
20 TABLET ORAL DAILY
Qty: 5 TABLET | Refills: 0 | Status: SHIPPED | OUTPATIENT
Start: 2023-08-15 | End: 2023-08-20

## 2023-08-15 RX ORDER — METOPROLOL TARTRATE 50 MG/1
50 TABLET, FILM COATED ORAL 3 TIMES DAILY
Qty: 90 TABLET | Refills: 0 | Status: SHIPPED | OUTPATIENT
Start: 2023-08-15

## 2023-08-15 RX ORDER — CHOLECALCIFEROL (VITAMIN D3) 125 MCG
5 CAPSULE ORAL NIGHTLY PRN
Qty: 30 TABLET | Refills: 0 | Status: SHIPPED | OUTPATIENT
Start: 2023-08-15

## 2023-08-15 RX ORDER — FAMOTIDINE 20 MG/1
20 TABLET, FILM COATED ORAL 2 TIMES DAILY
Qty: 60 TABLET | Refills: 0 | Status: SHIPPED | OUTPATIENT
Start: 2023-08-15

## 2023-08-15 RX ADMIN — SODIUM CHLORIDE, PRESERVATIVE FREE 10 ML: 5 INJECTION INTRAVENOUS at 08:12

## 2023-08-15 RX ADMIN — WATER 40 MG: 1 INJECTION INTRAMUSCULAR; INTRAVENOUS; SUBCUTANEOUS at 08:11

## 2023-08-15 RX ADMIN — APIXABAN 5 MG: 5 TABLET, FILM COATED ORAL at 08:12

## 2023-08-15 RX ADMIN — DIGOXIN 250 MCG: 0.25 TABLET ORAL at 08:12

## 2023-08-15 RX ADMIN — METOPROLOL TARTRATE 50 MG: 50 TABLET, FILM COATED ORAL at 08:12

## 2023-08-15 RX ADMIN — METOPROLOL TARTRATE 50 MG: 50 TABLET, FILM COATED ORAL at 15:03

## 2023-08-15 RX ADMIN — FAMOTIDINE 20 MG: 20 TABLET ORAL at 08:11

## 2023-08-15 ASSESSMENT — PAIN SCALES - GENERAL
PAINLEVEL_OUTOF10: 0
PAINLEVEL_OUTOF10: 0

## 2023-08-15 NOTE — DISCHARGE SUMMARY
Hospitalist Discharge Summary     Patient ID:    Cheyenne Travis  [id number]  77 y.o.  1950    Admit date: 8/2/2023    Discharge date : 8/15/2023      Final Diagnoses & Acute Mgmt:    S/p Acute metabolic encephalopathy - unknown time course based on available records although according to niece Orr Showers he has had \"memory problems\". Suspect underlying dementia and mental status is now probably at baseline. Patient son reports \"an injury\" many years ago that resulted in chronic cognitive issues and says his memory \"comes and goes\". MRI of brain negative for mass, infarcts - shows some volume loss and small vessel WM changes. Also with maxillary sinus opacification - pt denies symptoms. Squamous cell lung cancer - s/p bronchoscopy/Dr. Viviane Ortega -->fungating mass in left mainstem, left hilar lung mass with left supraclavicular and mediastinal lymphadenopathy. Moderate left pleural effusion s/p thoracentesis. Cultures negative. Was treated for 5 days with zosyn for possible Postobstructive pneumonia and completed another 7 days ceftriaxone. S/p IV steroids --> Prednisone.    => Oncology/Sukumaran Consulted - awaiting molecular studies. F/u as outpatient for staging and to consider treatment options depending on functional status and likely radiation therapy. He has been seen by Dr Caterina Rodriguez (rad-onc) as well. Hypercalcemia - mild . Likely due to lung cancer. Focal wall thickening noted in the rectum - no mass seen on sigmoidoscopy. Evaluated by Dr. Liz Chua and by Dr. Nelda Rdz. Aflutter with RVR - echo with mildly reduced EF 40-45% with global HK. No pericardial effusion. Metoprolol + Dig. Eliquis added. Evaluated by [facility]. Hypercoag state due to afib and also malignancy. SIRS vs sepsis(POA) - no overt infections. Treated empirically for 5 days with zosyn for possible post-obstructive pneumonia +/- rectal colitis and completed another 7 days ceftriaxone.

## 2023-08-15 NOTE — CARE COORDINATION
Transition of Care Plan:    RUR: 15%  Prior Level of Functioning: independent  Disposition: [address] [place] [address] (SNF)  If SNF or IPR: Date FOC offered: 8/14/23  Date FOC received: 8/14/23  Accepting facility: [address] [place] [address]   Date authorization started with reference number: 8/14/23  Date authorization received and expires: 8/15/23  Follow up appointments:   DME needed: N/A  Transportation at discharge: Hospital to home stretcher transportation  IM/IMM Medicare/ letter given: yes  Is patient a  and connected with [facility]? N/A   If yes, was [place] transfer form completed and VA notified? Caregiver Contact: Foster Dadds, son  Discharge Caregiver contacted prior to discharge? Yes   Care Conference needed? N/A  Barriers to discharge: N/A    Patient is discharging to [address] [place] [address] located at [facility], [address] [place] [address] Avenue room 213. Report may be called to .

## 2023-08-29 ENCOUNTER — HOSPITAL ENCOUNTER (OUTPATIENT)
Facility: HOSPITAL | Age: 73
Discharge: HOME | End: 2023-09-01

## 2023-09-12 ENCOUNTER — HOSPITAL ENCOUNTER (INPATIENT)
Facility: HOSPITAL | Age: 73
LOS: 2 days | Discharge: HOSPICE | DRG: 640 | End: 2023-09-14
Attending: EMERGENCY MEDICINE | Admitting: INTERNAL MEDICINE
Payer: MEDICARE

## 2023-09-12 DIAGNOSIS — R62.7 FAILURE TO THRIVE IN ADULT: ICD-10-CM

## 2023-09-12 DIAGNOSIS — E83.52 HYPERCALCEMIA: Primary | ICD-10-CM

## 2023-09-12 LAB
ALBUMIN SERPL-MCNC: 2.1 G/DL (ref 3.5–5)
ALBUMIN/GLOB SERPL: 0.5 (ref 1.1–2.2)
ALP SERPL-CCNC: 91 U/L (ref 45–117)
ALT SERPL-CCNC: 9 U/L (ref 12–78)
ANION GAP SERPL CALC-SCNC: 7 MMOL/L (ref 5–15)
APPEARANCE UR: CLEAR
AST SERPL W P-5'-P-CCNC: 21 U/L (ref 15–37)
BACTERIA URNS QL MICRO: NEGATIVE /HPF
BASOPHILS # BLD: 0 K/UL (ref 0–0.1)
BASOPHILS NFR BLD: 0 % (ref 0–1)
BILIRUB SERPL-MCNC: 0.5 MG/DL (ref 0.2–1)
BILIRUB UR QL: NEGATIVE
BUN SERPL-MCNC: 12 MG/DL (ref 6–20)
BUN/CREAT SERPL: 14 (ref 12–20)
CA-I BLD-MCNC: 14 MG/DL (ref 8.5–10.1)
CHLORIDE SERPL-SCNC: 107 MMOL/L (ref 97–108)
CO2 SERPL-SCNC: 30 MMOL/L (ref 21–32)
COLOR UR: ABNORMAL
CREAT SERPL-MCNC: 0.85 MG/DL (ref 0.7–1.3)
DIFFERENTIAL METHOD BLD: ABNORMAL
EOSINOPHIL # BLD: 0 K/UL (ref 0–0.4)
EOSINOPHIL NFR BLD: 0 % (ref 0–7)
EPITH CASTS URNS QL MICRO: ABNORMAL /LPF
ERYTHROCYTE [DISTWIDTH] IN BLOOD BY AUTOMATED COUNT: 20.9 % (ref 11.5–14.5)
GLOBULIN SER CALC-MCNC: 4.3 G/DL (ref 2–4)
GLUCOSE BLD STRIP.AUTO-MCNC: 93 MG/DL (ref 65–100)
GLUCOSE SERPL-MCNC: 104 MG/DL (ref 65–100)
GLUCOSE UR STRIP.AUTO-MCNC: NEGATIVE MG/DL
HCT VFR BLD AUTO: 34.4 % (ref 36.6–50.3)
HGB BLD-MCNC: 10 G/DL (ref 12.1–17)
HGB UR QL STRIP: ABNORMAL
HYALINE CASTS URNS QL MICRO: ABNORMAL /LPF (ref 0–5)
IMM GRANULOCYTES # BLD AUTO: 0.3 K/UL (ref 0–0.04)
IMM GRANULOCYTES NFR BLD AUTO: 1 % (ref 0–0.5)
KETONES UR QL STRIP.AUTO: 5 MG/DL
LEUKOCYTE ESTERASE UR QL STRIP.AUTO: NEGATIVE
LYMPHOCYTES # BLD: 2.7 K/UL (ref 0.8–3.5)
LYMPHOCYTES NFR BLD: 13 % (ref 12–49)
MAGNESIUM SERPL-MCNC: 2.1 MG/DL (ref 1.6–2.4)
MCH RBC QN AUTO: 21.6 PG (ref 26–34)
MCHC RBC AUTO-ENTMCNC: 29.1 G/DL (ref 30–36.5)
MCV RBC AUTO: 74.3 FL (ref 80–99)
MONOCYTES # BLD: 1.4 K/UL (ref 0–1)
MONOCYTES NFR BLD: 7 % (ref 5–13)
MUCOUS THREADS URNS QL MICRO: ABNORMAL /LPF
NEUTS SEG # BLD: 16.6 K/UL (ref 1.8–8)
NEUTS SEG NFR BLD: 79 % (ref 32–75)
NITRITE UR QL STRIP.AUTO: NEGATIVE
NRBC # BLD: 0 K/UL (ref 0–0.01)
NRBC BLD-RTO: 0 PER 100 WBC
PERFORMED BY:: NORMAL
PH UR STRIP: 5 (ref 5–8)
PLATELET # BLD AUTO: 392 K/UL (ref 150–400)
PMV BLD AUTO: 10.2 FL (ref 8.9–12.9)
POTASSIUM SERPL-SCNC: 3.9 MMOL/L (ref 3.5–5.1)
PROT SERPL-MCNC: 6.4 G/DL (ref 6.4–8.2)
PROT UR STRIP-MCNC: 30 MG/DL
RBC # BLD AUTO: 4.63 M/UL (ref 4.1–5.7)
RBC #/AREA URNS HPF: ABNORMAL /HPF (ref 0–5)
SODIUM SERPL-SCNC: 144 MMOL/L (ref 136–145)
SP GR UR REFRACTOMETRY: 1.02 (ref 1–1.03)
URINE CULTURE IF INDICATED: ABNORMAL
UROBILINOGEN UR QL STRIP.AUTO: 2 EU/DL (ref 0.1–1)
WBC # BLD AUTO: 21.1 K/UL (ref 4.1–11.1)
WBC URNS QL MICRO: ABNORMAL /HPF (ref 0–4)

## 2023-09-12 PROCEDURE — 85025 COMPLETE CBC W/AUTO DIFF WBC: CPT

## 2023-09-12 PROCEDURE — 83735 ASSAY OF MAGNESIUM: CPT

## 2023-09-12 PROCEDURE — 1100000000 HC RM PRIVATE

## 2023-09-12 PROCEDURE — 2580000003 HC RX 258: Performed by: INTERNAL MEDICINE

## 2023-09-12 PROCEDURE — 93005 ELECTROCARDIOGRAM TRACING: CPT | Performed by: EMERGENCY MEDICINE

## 2023-09-12 PROCEDURE — 81001 URINALYSIS AUTO W/SCOPE: CPT

## 2023-09-12 PROCEDURE — 80053 COMPREHEN METABOLIC PANEL: CPT

## 2023-09-12 PROCEDURE — 2580000003 HC RX 258: Performed by: EMERGENCY MEDICINE

## 2023-09-12 PROCEDURE — 6360000002 HC RX W HCPCS: Performed by: INTERNAL MEDICINE

## 2023-09-12 PROCEDURE — 82962 GLUCOSE BLOOD TEST: CPT

## 2023-09-12 PROCEDURE — 94761 N-INVAS EAR/PLS OXIMETRY MLT: CPT

## 2023-09-12 PROCEDURE — 36415 COLL VENOUS BLD VENIPUNCTURE: CPT

## 2023-09-12 PROCEDURE — 99285 EMERGENCY DEPT VISIT HI MDM: CPT

## 2023-09-12 PROCEDURE — 2700000000 HC OXYGEN THERAPY PER DAY

## 2023-09-12 RX ORDER — ENOXAPARIN SODIUM 100 MG/ML
40 INJECTION SUBCUTANEOUS DAILY
Status: DISCONTINUED | OUTPATIENT
Start: 2023-09-12 | End: 2023-09-15 | Stop reason: HOSPADM

## 2023-09-12 RX ORDER — SODIUM CHLORIDE 9 MG/ML
INJECTION, SOLUTION INTRAVENOUS CONTINUOUS
Status: DISCONTINUED | OUTPATIENT
Start: 2023-09-12 | End: 2023-09-15 | Stop reason: HOSPADM

## 2023-09-12 RX ORDER — SODIUM CHLORIDE 0.9 % (FLUSH) 0.9 %
5-40 SYRINGE (ML) INJECTION PRN
Status: DISCONTINUED | OUTPATIENT
Start: 2023-09-12 | End: 2023-09-15 | Stop reason: HOSPADM

## 2023-09-12 RX ORDER — SODIUM CHLORIDE 0.9 % (FLUSH) 0.9 %
5-40 SYRINGE (ML) INJECTION EVERY 12 HOURS SCHEDULED
Status: DISCONTINUED | OUTPATIENT
Start: 2023-09-12 | End: 2023-09-15 | Stop reason: HOSPADM

## 2023-09-12 RX ORDER — ONDANSETRON 4 MG/1
4 TABLET, ORALLY DISINTEGRATING ORAL EVERY 8 HOURS PRN
Status: DISCONTINUED | OUTPATIENT
Start: 2023-09-12 | End: 2023-09-15 | Stop reason: HOSPADM

## 2023-09-12 RX ORDER — ZOLEDRONIC ACID 5 MG/100ML
5 INJECTION, SOLUTION INTRAVENOUS ONCE
Status: DISCONTINUED | OUTPATIENT
Start: 2023-09-12 | End: 2023-09-12 | Stop reason: DRUGHIGH

## 2023-09-12 RX ORDER — ACETAMINOPHEN 160 MG
1000 TABLET,DISINTEGRATING ORAL DAILY
Status: ON HOLD | COMMUNITY
End: 2023-09-14 | Stop reason: HOSPADM

## 2023-09-12 RX ORDER — ACETAMINOPHEN 650 MG/1
650 SUPPOSITORY RECTAL EVERY 6 HOURS PRN
Status: DISCONTINUED | OUTPATIENT
Start: 2023-09-12 | End: 2023-09-15 | Stop reason: HOSPADM

## 2023-09-12 RX ORDER — BETAMETHASONE DIPROPIONATE 0.5 MG/G
0.05 CREAM TOPICAL DAILY
Status: ON HOLD | COMMUNITY
End: 2023-09-14 | Stop reason: HOSPADM

## 2023-09-12 RX ORDER — MAGNESIUM HYDROXIDE/ALUMINUM HYDROXICE/SIMETHICONE 120; 1200; 1200 MG/30ML; MG/30ML; MG/30ML
30 SUSPENSION ORAL EVERY 6 HOURS PRN
Status: DISCONTINUED | OUTPATIENT
Start: 2023-09-12 | End: 2023-09-15 | Stop reason: HOSPADM

## 2023-09-12 RX ORDER — CALCITONIN SALMON 200 [USP'U]/ML
4 INJECTION, SOLUTION INTRAMUSCULAR; SUBCUTANEOUS EVERY 12 HOURS
Status: COMPLETED | OUTPATIENT
Start: 2023-09-12 | End: 2023-09-13

## 2023-09-12 RX ORDER — 0.9 % SODIUM CHLORIDE 0.9 %
1000 INTRAVENOUS SOLUTION INTRAVENOUS
Status: COMPLETED | OUTPATIENT
Start: 2023-09-12 | End: 2023-09-12

## 2023-09-12 RX ORDER — POTASSIUM CHLORIDE 7.45 MG/ML
10 INJECTION INTRAVENOUS PRN
Status: DISCONTINUED | OUTPATIENT
Start: 2023-09-12 | End: 2023-09-15 | Stop reason: HOSPADM

## 2023-09-12 RX ORDER — ACETAMINOPHEN 325 MG/1
650 TABLET ORAL EVERY 6 HOURS PRN
Status: DISCONTINUED | OUTPATIENT
Start: 2023-09-12 | End: 2023-09-15 | Stop reason: HOSPADM

## 2023-09-12 RX ORDER — POLYETHYLENE GLYCOL 3350 17 G/17G
17 POWDER, FOR SOLUTION ORAL DAILY PRN
Status: DISCONTINUED | OUTPATIENT
Start: 2023-09-12 | End: 2023-09-15 | Stop reason: HOSPADM

## 2023-09-12 RX ORDER — ONDANSETRON 2 MG/ML
4 INJECTION INTRAMUSCULAR; INTRAVENOUS EVERY 6 HOURS PRN
Status: DISCONTINUED | OUTPATIENT
Start: 2023-09-12 | End: 2023-09-15 | Stop reason: HOSPADM

## 2023-09-12 RX ORDER — SODIUM CHLORIDE 9 MG/ML
INJECTION, SOLUTION INTRAVENOUS PRN
Status: DISCONTINUED | OUTPATIENT
Start: 2023-09-12 | End: 2023-09-15 | Stop reason: HOSPADM

## 2023-09-12 RX ADMIN — SODIUM CHLORIDE: 9 INJECTION, SOLUTION INTRAVENOUS at 14:57

## 2023-09-12 RX ADMIN — ZOLEDRONIC ACID 4 MG: 4 INJECTION, SOLUTION, CONCENTRATE INTRAVENOUS at 14:57

## 2023-09-12 RX ADMIN — SODIUM CHLORIDE: 9 INJECTION, SOLUTION INTRAVENOUS at 23:35

## 2023-09-12 RX ADMIN — CALCITONIN SALMON 272 UNITS: 200 INJECTION, SOLUTION INTRAMUSCULAR; SUBCUTANEOUS at 14:57

## 2023-09-12 RX ADMIN — SODIUM CHLORIDE 1000 ML: 9 INJECTION, SOLUTION INTRAVENOUS at 11:41

## 2023-09-12 RX ADMIN — CEFTRIAXONE SODIUM 1000 MG: 1 INJECTION, POWDER, FOR SOLUTION INTRAMUSCULAR; INTRAVENOUS at 14:57

## 2023-09-12 RX ADMIN — SODIUM CHLORIDE, PRESERVATIVE FREE 10 ML: 5 INJECTION INTRAVENOUS at 21:15

## 2023-09-12 ASSESSMENT — PAIN - FUNCTIONAL ASSESSMENT: PAIN_FUNCTIONAL_ASSESSMENT: 0-10

## 2023-09-12 ASSESSMENT — PAIN SCALES - GENERAL: PAINLEVEL_OUTOF10: 0

## 2023-09-12 NOTE — PROGRESS NOTES
Unable to complete admission. Patient not able to answer questions. Attempt to call fabio Knowles three times no answer. Attempt to call niece number disconnected.

## 2023-09-12 NOTE — PROGRESS NOTES
extremities. Skin: Skin color, texture, turgor normal. No rashes or lesions   Neurologic: CNII-XII intact.  Alert and oriented x1.       24 Hour Results:    Recent Results (from the past 24 hour(s))   CMP    Collection Time: 09/12/23 10:58 AM   Result Value Ref Range    Sodium 144 136 - 145 mmol/L    Potassium 3.9 3.5 - 5.1 mmol/L    Chloride 107 97 - 108 mmol/L    CO2 30 21 - 32 mmol/L    Anion Gap 7 5 - 15 mmol/L    Glucose 104 (H) 65 - 100 mg/dL    BUN 12 6 - 20 mg/dL    Creatinine 0.85 0.70 - 1.30 mg/dL    Bun/Cre Ratio 14 12 - 20      Est, Glom Filt Rate >60 >60 ml/min/1.73m2    Calcium 14.0 (HH) 8.5 - 10.1 mg/dL    Total Bilirubin 0.5 0.2 - 1.0 mg/dL    AST 21 15 - 37 U/L    ALT 9 (L) 12 - 78 U/L    Alk Phosphatase 91 45 - 117 U/L    Total Protein 6.4 6.4 - 8.2 g/dL    Albumin 2.1 (L) 3.5 - 5.0 g/dL    Globulin 4.3 (H) 2.0 - 4.0 g/dL    Albumin/Globulin Ratio 0.5 (L) 1.1 - 2.2     CBC with Auto Differential    Collection Time: 09/12/23 10:58 AM   Result Value Ref Range    WBC 21.1 (H) 4.1 - 11.1 K/uL    RBC 4.63 4.10 - 5.70 M/uL    Hemoglobin 10.0 (L) 12.1 - 17.0 g/dL    Hematocrit 34.4 (L) 36.6 - 50.3 %    MCV 74.3 (L) 80.0 - 99.0 FL    MCH 21.6 (L) 26.0 - 34.0 PG    MCHC 29.1 (L) 30.0 - 36.5 g/dL    RDW 20.9 (H) 11.5 - 14.5 %    Platelets 499 261 - 407 K/uL    MPV 10.2 8.9 - 12.9 FL    Nucleated RBCs 0.0 0.0  WBC    nRBC 0.00 0.00 - 0.01 K/uL    Neutrophils % 79 (H) 32 - 75 %    Lymphocytes % 13 12 - 49 %    Monocytes % 7 5 - 13 %    Eosinophils % 0 0 - 7 %    Basophils % 0 0 - 1 %    Immature Granulocytes 1 (H) 0 - 0.5 %    Neutrophils Absolute 16.6 (H) 1.8 - 8.0 K/UL    Lymphocytes Absolute 2.7 0.8 - 3.5 K/UL    Monocytes Absolute 1.4 (H) 0.0 - 1.0 K/UL    Eosinophils Absolute 0.0 0.0 - 0.4 K/UL    Basophils Absolute 0.0 0.0 - 0.1 K/UL    Absolute Immature Granulocyte 0.3 (H) 0.00 - 0.04 K/UL    Differential Type AUTOMATED     Urinalysis with Reflex to Culture    Collection Time: 09/12/23 10:58 AM

## 2023-09-12 NOTE — PROGRESS NOTES
.4 Eyes Skin Assessment     NAME:  Arslan Beasley  YOB: 1950  MEDICAL RECORD NUMBER:  [id number]    The patient is being assessed for  Admission    I agree that at least one RN has performed a thorough Head to Toe Skin Assessment on the patient. ALL assessment sites listed below have been assessed. Areas assessed by both nurses:    Head, Face, Ears, Shoulders, Back, Chest, Arms, Elbows, Hands, Sacrum. Buttock, Coccyx, Ischium, and Legs. Feet and Heels        Does the Patient have a Wound?  No noted wound(s)       Paras Prevention initiated by RN: Yes  Wound Care Orders initiated by RN: No    Pressure Injury (Stage 3,4, Unstageable, DTI, NWPT, and Complex wounds) if present, place Wound referral order by RN under : No    New Ostomies, if present place, Ostomy referral order under : No     Nurse 1 eSignature: Electronically signed by Jerel Coello RN on 9/12/23 at 6:38 PM EDT    **SHARE this note so that the co-signing nurse can place an eSignature**    Nurse 2 eSignature: Electronically signed by Suki Atkins RN on 9/12/23 at 6:47 PM EDT

## 2023-09-12 NOTE — ED TRIAGE NOTES
Per ems patient recently discharged from [address] [place] [address] and rehab, recently dx with lung cancer, failure to thrive, has not been taking meds as prescribed. Patient complains of pain all over.

## 2023-09-12 NOTE — PROGRESS NOTES
History & Physical    Primary Care Provider: None None  Source of Information: Patient/family     Chief complaint:   Chief Complaint   Patient presents with    Failure To Thrive        History of Presenting Illness:   Bella Montague, a 75 y.o. 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 He is overall a poor historian and has significantly altered mental status. He stated today was \"Thanksgiving\" and that \"he was at home\". According to niece and sister, he has refused to eat or drink anything over the last few days and has not been taking his medications as prescribed. EMS reported blood pressure of 68U systolic, but glucose was 102. Calcium level upon admission was 14.0, albumin 2.1, globulin 4.3, WBC 21.1, hemoglobin 10.0, hematocrit 34.4. Urinalysis demonstrated ketonuria, hematuria, proteinuria, and urobilinogenuria. Corrected calcium is greater than 15     Review of Systems:  A comprehensive review of systems was negative except for that written in the History of Present Illness. Past Medical History:   Diagnosis Date    Atrial flutter ([address])     Dementia ([address])     Stage 4 lung cancer ([facility])         Past Surgical History:   Procedure Laterality Date    BRONCHOSCOPY N/A 8/8/2023    BRONCHOSCOPY performed by Sukhjinder Butcher MD at [facility] ENDOSCOPY    IR INSERT NON TUNNELED CV CATH LESS THAN 5 YEARS  8/3/2023    IR INSERT NON TUNNELED CV CATH LESS THAN 5 YEARS 8/3/2023 Shyann Galindo., APRN - NP SSR RAD ANGIO IR    SIGMOIDOSCOPY N/A 8/7/2023    SIGMOIDOSCOPY BIOPSY FLEXIBLE performed by Abhi Mace MD at [address] [place] [address]       Prior to Admission medications    Medication Sig Start Date End Date Taking?  Authorizing Provider   ipratropium 0.5 mg-albuterol 2.5 mg (DUONEB) 0.5-2.5 (3) MG/3ML SOLN nebulizer solution Inhale 3 mLs into the lungs every 6 hours as needed for Shortness of Breath or

## 2023-09-12 NOTE — ED PROVIDER NOTES
30 mL (has no administration in time range)   acetaminophen (TYLENOL) tablet 650 mg (has no administration in time range)     Or   acetaminophen (TYLENOL) suppository 650 mg (has no administration in time range)   calcitonin (MIACALCIN) injection 272 Units (272 Units IntraMUSCular Given 9/12/23 1457)   cefTRIAXone (ROCEPHIN) 1,000 mg in sterile water 10 mL IV syringe (1,000 mg IntraVENous Given 9/12/23 1457)   zoledronic acid (ZOMETA) 4 mg in sodium chloride 0.9 % 100 mL IVPB (4 mg IntraVENous New Bag 9/12/23 1457)   sodium chloride 0.9 % bolus 1,000 mL (0 mLs IntraVENous Stopped 9/12/23 1342)       CONSULTS: (Who and What was discussed)  IP CONSULT TO CASE MANAGEMENT     Social Determinants affecting Dx or Tx: None    Smoking Cessation: Not Applicable    PROCEDURES   Unless otherwise noted above, none  Procedures      CRITICAL CARE TIME   CRITICAL CARE NOTE :    3:06 PM    IMPENDING DETERIORATION -Metabolic, renal  ASSOCIATED RISK FACTORS - Hypotension, Shock, and Metabolic changes  MANAGEMENT- Bedside Assessment and Supervision of Care  INTERPRETATION -  ECG, Blood Pressure, and Cardiac Output Measures   INTERVENTIONS - hemodynamic mgmt and Metabolic interventions  CASE REVIEW - Hospitalist/Intensivist, Nursing, and Family  TREATMENT RESPONSE -Stable  PERFORMED BY - Self    NOTES   :  I have spent 30 minutes of critical care time involved in lab review, consultations with specialist, family decision- making, bedside attention and documentation. This time excludes time spent in any separate billed procedures. During this entire length of time I was immediately available to the patient . Oral Montague MD    ED FINAL IMPRESSION     1. Hypercalcemia    2.  Failure to thrive in adult          DISPOSITION/PLAN   DISPOSITION Admitted 09/12/2023 11:44:01 AM    Admit Note: Pt is being admitted by hospitalist. The results of their tests and reason(s) for their admission have been discussed with pt and/or available family. They convey agreement and understanding for the need to be admitted and for the admission diagnosis. PATIENT REFERRED TO:  No follow-up provider specified. DISCHARGE MEDICATIONS:     Medication List        ASK your doctor about these medications      apixaban 5 MG Tabs tablet  Commonly known as: ELIQUIS  Take 1 tablet by mouth 2 times daily     digoxin 250 MCG tablet  Commonly known as: LANOXIN  Take 0.5 tablets by mouth daily     famotidine 20 MG tablet  Commonly known as: PEPCID  Take 1 tablet by mouth 2 times daily     ipratropium 0.5 mg-albuterol 2.5 mg 0.5-2.5 (3) MG/3ML Soln nebulizer solution  Commonly known as: DUONEB  Inhale 3 mLs into the lungs every 6 hours as needed for Shortness of Breath or Wheezing     melatonin 5 MG Tabs tablet  Take 1 tablet by mouth nightly as needed (insomnia)     metoprolol tartrate 50 MG tablet  Commonly known as: LOPRESSOR  Take 1 tablet by mouth in the morning, at noon, and at bedtime                DISCONTINUED MEDICATIONS:  Current Discharge Medication List          I am the Primary Clinician of Record. Georges Rodriguez MD (electronically signed)    (Please note that parts of this dictation were completed with voice recognition software. Quite often unanticipated grammatical, syntax, homophones, and other interpretive errors are inadvertently transcribed by the computer software. Please disregards these errors.  Please excuse any errors that have escaped final proofreading.)     Georges oRdriguez MD  09/12/23 3525

## 2023-09-12 NOTE — CARE COORDINATION
09/12/23 1310   Service Assessment   Patient Orientation Alert and Oriented;Person   Cognition Alert   History Provided By Patient; Child/Family; Other (see comment)  (Son Marcin Arrington & oliva Putnam). )   Primary Caregiver Family   Accompanied By/Relationship Oliva Putnam ) & sister Pablo Maloney. CM spoke with son ( Marcin Arrington) via [employer] cell phone in pt room. Support Systems Children;Family Members   Patient's [address] is: Legal Next of [facility]   PCP Verified by CM Yes  (New PCP - no visit yet - son to call back with MDs name. Seen by MD @ SNF.)   Last Visit to PCP Within last 3 months   Prior Functional Level Assistance with the following:;Bathing;Dressing;Feeding;Cooking;Housework; Toileting; Shopping;Mobility  (Walker/w/c.)   Current Functional Level Assistance with the following:;Bathing;Dressing; Toileting;Feeding;Cooking;Housework; Shopping;Mobility  (Walker/w/c.)   Can patient return to prior living arrangement No  (Family requesting SNF.)   Ability to make needs known: Poor   Family able to assist with home care needs: Yes   Would you like for me to discuss the discharge plan with any other family members/significant others, and if so, who?  Yes  (Son Sherley Prabhakar/rosalva Putnam.)   Financial Resources [email or web address] Resources None   Social/Functional History   Lives With Family  (Oliva Early.)   Type of [address] [place] [address]  One level   Home Access Level entry   Bathroom Shower/Tub Tub/Shower unit   [address], standard   [address] Help From Family   ADL Assistance Needs assistance   [address] assistance   [address] Responsibilities No   Ambulation Assistance Needs assistance  (Walker/w/c.)   Transfer Assistance Needs assistance   Active  No   Occupation Retired   Discharge Planning   Type of Residence Skilled Nursing Facility  (Collin [facility]ta gave verbal consent & niece Thais Kingsley signed Choice Letter for SNf & [facility] ([facility]) [facility].)   Living Arrangements Other (Comment)  (Facility)   Current Services Prior To Admission Other (Comment)  ([facility].)   Potential Assistance Needed Skilled Nursing Facility;Transportation   DME Ordered? No   Potential Assistance Purchasing Medications No   Type of Home Care Services None   Patient expects to be discharged to: Skilled nursing facility   One/Two Story Residence One story   History of falls? 0   Services At/After Discharge   Transition of Care Consult (CM Consult) Discharge Planning;SNF; Transportation Assistance   Partner SNF No   Reason Why Partner SNF Not Chosen Friend/family recommendation   [address] Discharge Transport;Skilled Nursing Facility (SNF)   [employer] Information Provided? No   Mode of Transport at Discharge BLS   Confirm Follow Up Transport Other (see comment)     CM met with pt, (niece) Chuy Stout, (sister) Jessee Garcia, & spoke with son ( Alli Jordan @ [phone])  on niece's  cell phone while in room. D/C Plan is SNF ( [facility] - pt d/c'ed from there last Friday) via transportation. [facility]) gave verbal consent via phone to Choice Letter & nicamryn Crowe) signed Choice Letter. Choice Letter is also for FYI to [facility] - pt is on services with them. Referral sent via [address]. Uses walker/w/c.

## 2023-09-13 LAB
ANION GAP SERPL CALC-SCNC: 5 MMOL/L (ref 5–15)
BASOPHILS # BLD: 0 K/UL (ref 0–0.1)
BASOPHILS NFR BLD: 0 % (ref 0–1)
BUN SERPL-MCNC: 9 MG/DL (ref 6–20)
BUN/CREAT SERPL: 21 (ref 12–20)
CA-I BLD-MCNC: 11.4 MG/DL (ref 8.5–10.1)
CHLORIDE SERPL-SCNC: 114 MMOL/L (ref 97–108)
CO2 SERPL-SCNC: 27 MMOL/L (ref 21–32)
CREAT SERPL-MCNC: 0.42 MG/DL (ref 0.7–1.3)
DIFFERENTIAL METHOD BLD: ABNORMAL
EKG ATRIAL RATE: 99 BPM
EKG DIAGNOSIS: NORMAL
EKG P AXIS: 84 DEGREES
EKG P-R INTERVAL: 134 MS
EKG Q-T INTERVAL: 296 MS
EKG QRS DURATION: 74 MS
EKG QTC CALCULATION (BAZETT): 379 MS
EKG R AXIS: 46 DEGREES
EKG T AXIS: 75 DEGREES
EKG VENTRICULAR RATE: 99 BPM
EOSINOPHIL # BLD: 0 K/UL (ref 0–0.4)
EOSINOPHIL NFR BLD: 0 % (ref 0–7)
ERYTHROCYTE [DISTWIDTH] IN BLOOD BY AUTOMATED COUNT: 20.5 % (ref 11.5–14.5)
GLUCOSE BLD STRIP.AUTO-MCNC: 79 MG/DL (ref 65–100)
GLUCOSE SERPL-MCNC: 89 MG/DL (ref 65–100)
HCT VFR BLD AUTO: 30 % (ref 36.6–50.3)
HGB BLD-MCNC: 8.6 G/DL (ref 12.1–17)
IMM GRANULOCYTES # BLD AUTO: 0.2 K/UL (ref 0–0.04)
IMM GRANULOCYTES NFR BLD AUTO: 1 % (ref 0–0.5)
LYMPHOCYTES # BLD: 1.9 K/UL (ref 0.8–3.5)
LYMPHOCYTES NFR BLD: 10 % (ref 12–49)
MCH RBC QN AUTO: 21.5 PG (ref 26–34)
MCHC RBC AUTO-ENTMCNC: 28.7 G/DL (ref 30–36.5)
MCV RBC AUTO: 75 FL (ref 80–99)
MONOCYTES # BLD: 1.1 K/UL (ref 0–1)
MONOCYTES NFR BLD: 6 % (ref 5–13)
NEUTS SEG # BLD: 15.8 K/UL (ref 1.8–8)
NEUTS SEG NFR BLD: 83 % (ref 32–75)
NRBC # BLD: 0 K/UL (ref 0–0.01)
NRBC BLD-RTO: 0 PER 100 WBC
PERFORMED BY:: NORMAL
PLATELET # BLD AUTO: 302 K/UL (ref 150–400)
PMV BLD AUTO: 10.9 FL (ref 8.9–12.9)
POTASSIUM SERPL-SCNC: 3.4 MMOL/L (ref 3.5–5.1)
RBC # BLD AUTO: 4 M/UL (ref 4.1–5.7)
RBC MORPH BLD: ABNORMAL
SODIUM SERPL-SCNC: 146 MMOL/L (ref 136–145)
WBC # BLD AUTO: 19 K/UL (ref 4.1–11.1)

## 2023-09-13 PROCEDURE — 85025 COMPLETE CBC W/AUTO DIFF WBC: CPT

## 2023-09-13 PROCEDURE — 82962 GLUCOSE BLOOD TEST: CPT

## 2023-09-13 PROCEDURE — 97530 THERAPEUTIC ACTIVITIES: CPT

## 2023-09-13 PROCEDURE — 97161 PT EVAL LOW COMPLEX 20 MIN: CPT

## 2023-09-13 PROCEDURE — 6360000002 HC RX W HCPCS: Performed by: INTERNAL MEDICINE

## 2023-09-13 PROCEDURE — 2580000003 HC RX 258: Performed by: INTERNAL MEDICINE

## 2023-09-13 PROCEDURE — 80048 BASIC METABOLIC PNL TOTAL CA: CPT

## 2023-09-13 PROCEDURE — 36415 COLL VENOUS BLD VENIPUNCTURE: CPT

## 2023-09-13 PROCEDURE — 1100000000 HC RM PRIVATE

## 2023-09-13 RX ADMIN — CEFTRIAXONE SODIUM 1000 MG: 1 INJECTION, POWDER, FOR SOLUTION INTRAMUSCULAR; INTRAVENOUS at 13:56

## 2023-09-13 RX ADMIN — CALCITONIN SALMON 272 UNITS: 200 INJECTION, SOLUTION INTRAMUSCULAR; SUBCUTANEOUS at 02:32

## 2023-09-13 RX ADMIN — SODIUM CHLORIDE: 9 INJECTION, SOLUTION INTRAVENOUS at 21:58

## 2023-09-13 RX ADMIN — SODIUM CHLORIDE: 9 INJECTION, SOLUTION INTRAVENOUS at 06:05

## 2023-09-13 RX ADMIN — SODIUM CHLORIDE: 9 INJECTION, SOLUTION INTRAVENOUS at 13:56

## 2023-09-13 ASSESSMENT — PAIN SCALES - GENERAL
PAINLEVEL_OUTOF10: 0

## 2023-09-13 NOTE — PROGRESS NOTES
Hospitalist Progress Note               Daily Progress Note: 9/13/2023      Chief complaint:   Chief Complaint   Patient presents with    Failure To Thrive        Subjective:   Hospital course to date:    Raheel Parish, a 75 y.o. 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 He is overall a poor historian and has significantly altered mental status. He stated today was \"Thanksgiving\" and that \"he was at home\". According to niece and sister, he has refused to eat or drink anything over the last few days and has not been taking his medications as prescribed. EMS reported blood pressure of 64W systolic, but glucose was 102. Calcium level upon admission was 14.0, albumin 2.1, globulin 4.3, WBC 21.1, hemoglobin 10.0, hematocrit 34.4. Urinalysis demonstrated ketonuria, hematuria, proteinuria, and urobilinogenuria.     --------  Patient is seen today for follow-up. He is still confused, thinking that he's at home right now. Was unable to tell me if he has children. He is not in any pain at this time. Calcium has decreased to 12.9. His family has been involved in conversation of long-term care goals.         Medications reviewed  Current Facility-Administered Medications   Medication Dose Route Frequency    0.9 % sodium chloride infusion   IntraVENous Continuous    sodium chloride flush 0.9 % injection 5-40 mL  5-40 mL IntraVENous 2 times per day    sodium chloride flush 0.9 % injection 5-40 mL  5-40 mL IntraVENous PRN    0.9 % sodium chloride infusion   IntraVENous PRN    potassium chloride 10 mEq/100 mL IVPB (Peripheral Line)  10 mEq IntraVENous PRN    [Held by provider] enoxaparin (LOVENOX) injection 40 mg  40 mg SubCUTAneous Daily    ondansetron (ZOFRAN-ODT) disintegrating tablet 4 mg  4 mg Oral Q8H PRN    Or    ondansetron (ZOFRAN) injection 4 mg  4 mg IntraVENous Q6H PRN    polyethylene \"CARBOXHGBART\", \"METHGBART\", \"F0CCJCWHL\", \"PHCORART\", \"TEMP\" in the last 72 hours.     Invalid input(s): \"AVM0VCIRH\"    24 Hour Results:  Recent Results (from the past 24 hour(s))   POCT Glucose    Collection Time: 09/12/23  7:41 PM   Result Value Ref Range    POC Glucose 93 65 - 100 mg/dL    Performed by: Allen Lewis    Basic Metabolic Panel w/ Reflex to MG    Collection Time: 09/13/23  6:19 AM   Result Value Ref Range    Sodium 146 (H) 136 - 145 mmol/L    Potassium 3.4 (L) 3.5 - 5.1 mmol/L    Chloride 114 (H) 97 - 108 mmol/L    CO2 27 21 - 32 mmol/L    Anion Gap 5 5 - 15 mmol/L    Glucose 89 65 - 100 mg/dL    BUN 9 6 - 20 mg/dL    Creatinine 0.42 (L) 0.70 - 1.30 mg/dL    Bun/Cre Ratio 21 (H) 12 - 20      Est, Glom Filt Rate >60 >60 ml/min/1.73m2    Calcium 11.4 (H) 8.5 - 10.1 mg/dL   CBC with Auto Differential    Collection Time: 09/13/23  6:19 AM   Result Value Ref Range    WBC 19.0 (H) 4.1 - 11.1 K/uL    RBC 4.00 (L) 4.10 - 5.70 M/uL    Hemoglobin 8.6 (L) 12.1 - 17.0 g/dL    Hematocrit 30.0 (L) 36.6 - 50.3 %    MCV 75.0 (L) 80.0 - 99.0 FL    MCH 21.5 (L) 26.0 - 34.0 PG    MCHC 28.7 (L) 30.0 - 36.5 g/dL    RDW 20.5 (H) 11.5 - 14.5 %    Platelets 449 590 - 892 K/uL    MPV 10.9 8.9 - 12.9 FL    Nucleated RBCs 0.0 0.0  WBC    nRBC 0.00 0.00 - 0.01 K/uL    Neutrophils % 83 (H) 32 - 75 %    Lymphocytes % 10 (L) 12 - 49 %    Monocytes % 6 5 - 13 %    Eosinophils % 0 0 - 7 %    Basophils % 0 0 - 1 %    Immature Granulocytes 1 (H) 0 - 0.5 %    Neutrophils Absolute 15.8 (H) 1.8 - 8.0 K/UL    Lymphocytes Absolute 1.9 0.8 - 3.5 K/UL    Monocytes Absolute 1.1 (H) 0.0 - 1.0 K/UL    Eosinophils Absolute 0.0 0.0 - 0.4 K/UL    Basophils Absolute 0.0 0.0 - 0.1 K/UL    Absolute Immature Granulocyte 0.2 (H) 0.00 - 0.04 K/UL    Differential Type Smear Scanned      RBC Comment Anisocytosis  1+        RBC Comment Microcytosis  1+        RBC Comment Hypochromia  1+       POCT Glucose    Collection Time: 09/13/23  7:30 AM

## 2023-09-13 NOTE — PROGRESS NOTES
4 Eyes Skin Assessment     NAME:  Kalina Fung  YOB: 1950  MEDICAL RECORD NUMBER:  [id number]    The patient is being assessed for  Weekly    I agree that at least one RN has performed a thorough Head to Toe Skin Assessment on the patient. ALL assessment sites listed below have been assessed. Areas assessed by both nurses:    Head, Face, Ears, Shoulders, Back, Chest, Arms, Elbows, Hands, Sacrum. Buttock, Coccyx, Ischium, Legs. Feet and Heels, and Under Medical Devices         Does the Patient have a Wound?  No noted wound(s)       Paras Prevention initiated by RN: No  Wound Care Orders initiated by RN: No    Pressure Injury (Stage 3,4, Unstageable, DTI, NWPT, and Complex wounds) if present, place Wound referral order by RN under : No    New Ostomies, if present place, Ostomy referral order under : No     Nurse 1 eSignature: Electronically signed by Zuhair Tang RN on 9/13/23 at 2:06 PM EDT    **SHARE this note so that the co-signing nurse can place an eSignature**    Nurse 2 eSignature: Electronically signed by Michelle Figueroa RN on 9/13/23 at 2:08 PM EDT

## 2023-09-13 NOTE — H&P
History & Physical    Primary Care Provider: None None  Source of Information: Patient/family     Chief complaint:   Chief Complaint   Patient presents with    Failure To Thrive        History of Presenting Illness:   Lisandra Latham, a 75 y.o. 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 He is overall a poor historian and has significantly altered mental status. He stated today was \"Thanksgiving\" and that \"he was at home\". According to niece and sister, he has refused to eat or drink anything over the last few days and has not been taking his medications as prescribed. EMS reported blood pressure of 26N systolic, but glucose was 102. Calcium level upon admission was 14.0, albumin 2.1, globulin 4.3, WBC 21.1, hemoglobin 10.0, hematocrit 34.4. Urinalysis demonstrated ketonuria, hematuria, proteinuria, and urobilinogenuria. Corrected calcium is greater than 15     Review of Systems:  A comprehensive review of systems was negative except for that written in the History of Present Illness. Past Medical History:   Diagnosis Date    Atrial flutter ([address])     Dementia ([address] )     Stage 4 lung cancer ([facility] [address] [facility])         Past Surgical History:   Procedure Laterality Date    BRONCHOSCOPY N/A 8/8/2023    BRONCHOSCOPY performed by Charmayne Barns, MD at [facility] ENDOSCOPY    IR INSERT NON TUNNELED CV CATH LESS THAN 5 YEARS  8/3/2023    IR INSERT NON TUNNELED CV CATH LESS THAN 5 YEARS 8/3/2023 Carmelo Mcrae., APRN - NP SSR RAD ANGIO IR    SIGMOIDOSCOPY N/A 8/7/2023    SIGMOIDOSCOPY BIOPSY FLEXIBLE performed by Malgorzata Tate MD at [facility]       Prior to Admission medications    Medication Sig Start Date End Date Taking?  Authorizing Provider   Cholecalciferol (VITAMIN D3) 50 MCG (2000 UT) CAPS Take 1,000 Units by mouth daily   Yes Historical Provider, MD   betamethasone dipropionate 0.05 % cream Apply 0.25 g 0.4 K/UL    Basophils Absolute 0.0 0.0 - 0.1 K/UL    Absolute Immature Granulocyte 0.2 (H) 0.00 - 0.04 K/UL    Differential Type Smear Scanned      RBC Comment Anisocytosis  1+        RBC Comment Microcytosis  1+        RBC Comment Hypochromia  1+       POCT Glucose    Collection Time: 09/13/23  7:30 AM   Result Value Ref Range    POC Glucose 79 65 - 100 mg/dL    Performed by: Muriel Calderon          Imaging:   No orders to display          Discussion/Medical Decision Making: Patient with numerous medical comorbidities, each with increased risk for mortality and morbidity if left untreated. I have reviewed patient's presenting subjective and objective findings, as well as all laboratory studies, imaging studies, and vital signs to date as well as treatment rendered  and patient's response to those treatments. In addition, prior medical, surgical and relevant social and family histories were reviewed. I have discussed patient's presentation/findings and clinical course to date with ED provider. Given the patient's current clinical presentation, I have a high level of concern for decompensation if discharged from the emergency department and that patient warrants admission to hospital.     Assessment:     Hypercalcemia   - Most likely secondary to squamous cell carcinoma of the lung  -Followed by Dr. Rick Alan and Dr. Hamida Rodas  -On 2 L of O2 via nasal cannula at baseline    Stage IV squamous cell carcinoma of the lung    Microscopic hematuria and mild pyuria, rule out UTI    Altered mental status: Likely metabolic encephalopathy due to dehydration and hypercalcemia. Patient has some type of underlying brain injury with cognitive deficit    History of atrial flutter with RVR: echo in August 2023 demonstrated mildly reduced EF 40-45% with global HK.    [facility]  -current medications include apixaban, metoprolol tartrate, digoxin    Chronic systolic heart failure, EF 40%    Anemia of

## 2023-09-13 NOTE — CARE COORDINATION
0755: CM has reviewed pt chart    Pt on IV abx, IVF. Pt on reg diet. PT/OT to eval and rec. DCP: [facility], on service with Maria Isabel [facility]. Family applied for [facility]. Family also interested in hospice discussion. 1113: MD discussed hospice with pt family. CM will f/u with family. AMIRA sent message to [facility] requesting information on contracted hospice agencies. Awaiting reply. 1317: AMIRA called pt son Cain Dye and discussed dispo. Son says he wishes pt to return to [address] [place] [address] on hospice. [address] [place] [address] says they are contracted with [facility]. Pt son gave permission to send referral to [facility]. 1354: [facility] completed. CM to request MD signature.

## 2023-09-13 NOTE — PROGRESS NOTES
Comprehensive Nutrition Assessment    Type and Reason for Visit:  Initial, Positive Nutrition Screen (MST 3)    Nutrition Recommendations/Plan:   Rec'd speech consult if congruent w/ goals of care (on MM5/Thin Liq diet last admission)  Continue regular diet   Continue Ensure Enlive if congruent w/ goals of care  Continue to document PO/ONS intake and acceptance in flowsheets   Continue encouragement at meals as able      Malnutrition Assessment:  Malnutrition Status:  Mild malnutrition (09/13/23 1213)    Context:  Acute Illness     Findings of the 6 clinical characteristics of malnutrition:  Energy Intake:  Mild decrease in energy intake (Comment) (refusing to eat/drink anything x few days per H&P, pt w/ poor intakes during previous admission 8/9. SHAHRZAD nutrition hx d/t AMS.)  Weight Loss:  No significant weight loss (~2% wt loss x 1 month (previous admission to now - [facility]))     Body Fat Loss:  No significant body fat loss     Muscle Mass Loss:  Mild muscle mass loss (?age/nutrition) Temples (temporalis), Clavicles (pectoralis & deltoids)  Fluid Accumulation:  No significant fluid accumulation     Strength:  Not Performed    Nutrition Assessment:    Pt presents for FTT - refusing to eat/drink anything x few days. Pt w/ underlying brain injury w/ cognitive deficit and CHF. ~10% wt loss x 1 month per EMR (note, current wt w/ no method recorded). Pt w/ a few bites of breakfast per RN, no Ensure consumption. Pt w/ minimal response to RD questions during interview, SHAHRZAD nutrition/wt hx. Hospice care consulted, family willing to proceed w/ hospice care per documentation. Will continue regular diet w/ ONS if within goals of care. Labs: Na 146, K 3.4, Cl 114, Creat 0.42, Ca 11.4, H&H 8.6/30, MCV 75. Meds: Rocephin, NaCl. Nutrition Related Findings:    LBM 9/12. No edema noted. No N/V/D/C per RN report.  SLP following during previous admission, rec'd MM5/Thin Liq diet per RD note (8/9), +missing teeth per RD Weight    Discharge Planning:     Too soon to determine     Kip Marinelli RD  Contact: [phone]

## 2023-09-13 NOTE — PLAN OF CARE
Problem: Pain  Goal: Verbalizes/displays adequate comfort level or baseline comfort level  Outcome: Progressing     Problem: Discharge Planning  Goal: Discharge to home or other facility with appropriate resources  Outcome: Progressing  Flowsheets  Taken 9/12/2023 2115 by Cecil Allen RN  Discharge to home or other facility with appropriate resources: Identify discharge learning needs (meds, wound care, etc)  Taken 9/12/2023 1737 by Mary Diego RN  Discharge to home or other facility with appropriate resources:   Identify barriers to discharge with patient and caregiver   Arrange for needed discharge resources and transportation as appropriate     Problem: Safety - Adult  Goal: Free from fall injury  Outcome: Progressing     Problem: Skin/Tissue Integrity  Goal: Absence of new skin breakdown  Description: 1. Monitor for areas of redness and/or skin breakdown  2. Assess vascular access sites hourly  3. Every 4-6 hours minimum:  Change oxygen saturation probe site  4. Every 4-6 hours:  If on nasal continuous positive airway pressure, respiratory therapy assess nares and determine need for appliance change or resting period.   Outcome: Progressing     Problem: ABCDS Injury Assessment  Goal: Absence of physical injury  Outcome: Progressing

## 2023-09-14 VITALS
BODY MASS INDEX: 20.32 KG/M2 | OXYGEN SATURATION: 100 % | HEART RATE: 93 BPM | HEIGHT: 72 IN | RESPIRATION RATE: 18 BRPM | TEMPERATURE: 97.5 F | DIASTOLIC BLOOD PRESSURE: 71 MMHG | SYSTOLIC BLOOD PRESSURE: 107 MMHG | WEIGHT: 150 LBS

## 2023-09-14 LAB
ALBUMIN SERPL-MCNC: 2.1 G/DL (ref 3.5–5)
ANION GAP SERPL CALC-SCNC: 7 MMOL/L (ref 5–15)
BASOPHILS # BLD: 0 K/UL (ref 0–0.1)
BASOPHILS NFR BLD: 0 % (ref 0–1)
BUN SERPL-MCNC: 9 MG/DL (ref 6–20)
BUN/CREAT SERPL: 16 (ref 12–20)
CA-I BLD-MCNC: 10.8 MG/DL (ref 8.5–10.1)
CHLORIDE SERPL-SCNC: 110 MMOL/L (ref 97–108)
CO2 SERPL-SCNC: 27 MMOL/L (ref 21–32)
CREAT SERPL-MCNC: 0.56 MG/DL (ref 0.7–1.3)
DIFFERENTIAL METHOD BLD: ABNORMAL
EOSINOPHIL # BLD: 0 K/UL (ref 0–0.4)
EOSINOPHIL NFR BLD: 0 % (ref 0–7)
ERYTHROCYTE [DISTWIDTH] IN BLOOD BY AUTOMATED COUNT: 20.7 % (ref 11.5–14.5)
GLUCOSE SERPL-MCNC: 84 MG/DL (ref 65–100)
HCT VFR BLD AUTO: 33.2 % (ref 36.6–50.3)
HGB BLD-MCNC: 9.4 G/DL (ref 12.1–17)
IMM GRANULOCYTES # BLD AUTO: 0.3 K/UL (ref 0–0.04)
IMM GRANULOCYTES NFR BLD AUTO: 2 % (ref 0–0.5)
LYMPHOCYTES # BLD: 1.3 K/UL (ref 0.8–3.5)
LYMPHOCYTES NFR BLD: 7 % (ref 12–49)
MCH RBC QN AUTO: 21.2 PG (ref 26–34)
MCHC RBC AUTO-ENTMCNC: 28.3 G/DL (ref 30–36.5)
MCV RBC AUTO: 74.9 FL (ref 80–99)
MONOCYTES # BLD: 0.9 K/UL (ref 0–1)
MONOCYTES NFR BLD: 5 % (ref 5–13)
NEUTS SEG # BLD: 16 K/UL (ref 1.8–8)
NEUTS SEG NFR BLD: 86 % (ref 32–75)
NRBC # BLD: 0.04 K/UL (ref 0–0.01)
NRBC BLD-RTO: 0.2 PER 100 WBC
PHOSPHATE SERPL-MCNC: 1.4 MG/DL (ref 2.6–4.7)
PLATELET # BLD AUTO: 263 K/UL (ref 150–400)
PMV BLD AUTO: 10.3 FL (ref 8.9–12.9)
POTASSIUM SERPL-SCNC: 3.4 MMOL/L (ref 3.5–5.1)
RBC # BLD AUTO: 4.43 M/UL (ref 4.1–5.7)
SODIUM SERPL-SCNC: 144 MMOL/L (ref 136–145)
WBC # BLD AUTO: 18.5 K/UL (ref 4.1–11.1)

## 2023-09-14 PROCEDURE — 85025 COMPLETE CBC W/AUTO DIFF WBC: CPT

## 2023-09-14 PROCEDURE — 6370000000 HC RX 637 (ALT 250 FOR IP): Performed by: INTERNAL MEDICINE

## 2023-09-14 PROCEDURE — 2580000003 HC RX 258: Performed by: INTERNAL MEDICINE

## 2023-09-14 PROCEDURE — 94761 N-INVAS EAR/PLS OXIMETRY MLT: CPT

## 2023-09-14 PROCEDURE — 80069 RENAL FUNCTION PANEL: CPT

## 2023-09-14 PROCEDURE — 2700000000 HC OXYGEN THERAPY PER DAY

## 2023-09-14 PROCEDURE — 6360000002 HC RX W HCPCS: Performed by: INTERNAL MEDICINE

## 2023-09-14 PROCEDURE — 36415 COLL VENOUS BLD VENIPUNCTURE: CPT

## 2023-09-14 RX ORDER — OXYCODONE HYDROCHLORIDE 5 MG/1
5 TABLET ORAL EVERY 6 HOURS PRN
Qty: 12 TABLET | Refills: 0 | Status: SHIPPED | OUTPATIENT
Start: 2023-09-14 | End: 2023-09-17

## 2023-09-14 RX ADMIN — DIBASIC SODIUM PHOSPHATE, MONOBASIC POTASSIUM PHOSPHATE AND MONOBASIC SODIUM PHOSPHATE 1 TABLET: 852; 155; 130 TABLET ORAL at 10:14

## 2023-09-14 RX ADMIN — CEFTRIAXONE SODIUM 1000 MG: 1 INJECTION, POWDER, FOR SOLUTION INTRAMUSCULAR; INTRAVENOUS at 13:29

## 2023-09-14 RX ADMIN — SODIUM CHLORIDE: 9 INJECTION, SOLUTION INTRAVENOUS at 08:23

## 2023-09-14 RX ADMIN — DIBASIC SODIUM PHOSPHATE, MONOBASIC POTASSIUM PHOSPHATE AND MONOBASIC SODIUM PHOSPHATE 1 TABLET: 852; 155; 130 TABLET ORAL at 19:52

## 2023-09-14 NOTE — CARE COORDINATION
Pt discharging home on hospice with [facility] transport to be arranged with [facility]    Charge Nurse notified    Transition of Care Plan:    RUR: 20%  Prior Level of Functioning: HH  Disposition: Home Hospice  If SNF or IPR: Date FOC offered: n/a  Date FOC received:   Accepting facility:   Date authorization started with reference number:   Date authorization received and expires: Follow up appointments: unit secretary will setup as needed  DME needed: hospice arranging DME  Transportation at discharge: Stretcher with [facility]  IM/IMM Medicare/Bill letter given: yes  Is patient a  and connected with VA? no   If yes, was [employer] transfer form completed and [facility] notified? Caregiver Contact: [facility]  Discharge Caregiver contacted prior to discharge?  yes  Care Conference needed? no  Barriers to discharge: none

## 2023-09-14 NOTE — CARE COORDINATION
0809: CM has reviewed pt chart    Pt ready for dc    DCP: Hospice. .the pt does not have active viv and cannot go to a facility on hospice at this time. CM will reach out to pt son and discuss dispo. 1040: CM spoke with pt son Floresita Purdy and informed that it would be a private pay cost for board at [address] [place] [address]. Son wanted to know the cost, CM instructed pt to call [facility] and speak with their business office to get a quote. Son to figure out where pt can dc to on hospice and will get back with CM.      1131: CM received call from pt son. He contacted [facility] hospice. Pt will return home with niece on hospice. CM will contact [facility] and [facility] to set up transport time.

## 2023-09-14 NOTE — PLAN OF CARE
Problem: Pain  Goal: Verbalizes/displays adequate comfort level or baseline comfort level  Outcome: Progressing     Problem: Discharge Planning  Goal: Discharge to home or other facility with appropriate resources  Outcome: Progressing  Flowsheets (Taken 9/14/2023 6334)  Discharge to home or other facility with appropriate resources: Identify barriers to discharge with patient and caregiver     Problem: Safety - Adult  Goal: Free from fall injury  Outcome: Progressing     Problem: Skin/Tissue Integrity  Goal: Absence of new skin breakdown  Description: 1. Monitor for areas of redness and/or skin breakdown  2. Assess vascular access sites hourly  3. Every 4-6 hours minimum:  Change oxygen saturation probe site  4. Every 4-6 hours:  If on nasal continuous positive airway pressure, respiratory therapy assess nares and determine need for appliance change or resting period.   Outcome: Progressing     Problem: ABCDS Injury Assessment  Goal: Absence of physical injury  Outcome: Progressing

## 2023-09-14 NOTE — DISCHARGE SUMMARY
Physician Discharge Summary     Patient ID:    Chelsey Antonio  [id number]  79 y.o.  1950    Admit date: 9/12/2023    Discharge date : 9/14/2023      Final Diagnoses:   Hypercalcemia  Failure to thrive   Squamous cell lung cancer  Altered mental status  History of atrial flutter with RVR  Chronic systolic heart failure, EF 40%  Anemia of malignancy      Reason for Hospitalization:    Chelsey Antonio, a 75 y.o. 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 He is overall a poor historian and has significantly altered mental status. He stated today was \"Thanksgiving\" and that \"he was at home\". According to niece and sister, he has refused to eat or drink anything over the last few days and has not been taking his medications as prescribed. EMS reported blood pressure of 69N systolic, but glucose was 102. Calcium level upon admission was 14.0, albumin 2.1, globulin 4.3, WBC 21.1, hemoglobin 10.0, hematocrit 34.4. Urinalysis demonstrated ketonuria, hematuria, proteinuria, and urobilinogenuria. Hospital Course:     Patient was initially brought to the ED for adult failure to thrive. Upon admission, he was found to have hypercalcemia and was started on IV isotonic solution, zoledronic acid, and calcitonin. Calcium level has improved, but overall functional status has remained poor. Given his poor functional status, he is not a candidate for palliative cancer treatment and will be placed on hospice. Family was informed of patient's condition and is agreeable to this plan. Discharge Medications:      Medication List        START taking these medications      oxyCODONE 5 MG immediate release tablet  Commonly known as: Roxicodone  Take 1 tablet by mouth every 6 hours as needed for Pain for up to 3 days. Intended supply: 3 days.  Take lowest dose possible to manage pain Max Pulses: 2+ and symmetric all extremities. Skin: Skin color, texture, turgor normal. No rashes or lesions   Neurologic: CNII-XII intact. Alert and oriented x2             Significant Diagnostic Studies:   9/12/2023: BUN 12 mg/dL (Ref range: 6 - 20 mg/dL); Calcium 14.0 mg/dL (HH; Ref range: 8.5 - 10.1 mg/dL); Chloride 107 mmol/L (Ref range: 97 - 108 mmol/L); CO2 30 mmol/L (Ref range: 21 - 32 mmol/L); Creatinine 0.85 mg/dL (Ref range: 0.70 - 1.30 mg/dL); Glucose 104 mg/dL (H; Ref range: 65 - 100 mg/dL); Hematocrit 34.4 % (L; Ref range: 36.6 - 50.3 %); Hemoglobin 10.0 g/dL (L; Ref range: 12.1 - 17.0 g/dL); Potassium 3.9 mmol/L (Ref range: 3.5 - 5.1 mmol/L); Sodium 144 mmol/L (Ref range: 136 - 145 mmol/L)  9/13/2023: BUN 9 mg/dL (Ref range: 6 - 20 mg/dL); Calcium 11.4 mg/dL (H; Ref range: 8.5 - 10.1 mg/dL); Chloride 114 mmol/L (H; Ref range: 97 - 108 mmol/L); CO2 27 mmol/L (Ref range: 21 - 32 mmol/L); Creatinine 0.42 mg/dL (L; Ref range: 0.70 - 1.30 mg/dL); Glucose 89 mg/dL (Ref range: 65 - 100 mg/dL); Hematocrit 30.0 % (L; Ref range: 36.6 - 50.3 %); Hemoglobin 8.6 g/dL (L; Ref range: 12.1 - 17.0 g/dL); Potassium 3.4 mmol/L (L; Ref range: 3.5 - 5.1 mmol/L); Sodium 146 mmol/L (H; Ref range: 136 - 145 mmol/L)  Recent Labs     09/13/23  0619 09/14/23  0642   WBC 19.0* 18.5*   HGB 8.6* 9.4*   HCT 30.0* 33.2*    263     Recent Labs     09/12/23  1058 09/13/23  0619 09/14/23  0642    146* 144   K 3.9 3.4* 3.4*    114* 110*   CO2 30 27 27   BUN 12 9 9   CREATININE 0.85 0.42* 0.56*   GLUCOSE 104* 89 84   CALCIUM 14.0* 11.4* 10.8*   MG 2.1  --   --    PHOS  --   --  1.4*     Recent Labs     09/12/23  1058 09/14/23  0642   AST 21  --    ALT 9*  --    ALKPHOS 91  --    BILITOT 0.5  --    PROT 6.4  --    LABALBU 2.1* 2.1*   GLOB 4.3*  --      No results for input(s): \"INR\", \"PROTIME\", \"APTT\" in the last 72 hours. No results for input(s): \"IRON\", \"TIBC\", \"FERR\" in the last 72 hours.     Invalid input(s):

## 2023-09-14 NOTE — PROGRESS NOTES
Pt being discharge home on hospice. Pt VS stable. IV removed. Discharge instructions given to his son, Hunter Sierra over the phone. Son had no further questions at this time. Waiting for transport at this time.

## 2023-09-14 NOTE — DISCHARGE SUMMARY
Daily Amount: 20 mg     phosphorus [phone] MG tablet  Commonly known as: K PHOS NEUTRAL  Take 1 tablet by mouth 2 times daily            CONTINUE taking these medications      apixaban 5 MG Tabs tablet  Commonly known as: ELIQUIS  Take 1 tablet by mouth 2 times daily     famotidine 20 MG tablet  Commonly known as: PEPCID  Take 1 tablet by mouth 2 times daily     ipratropium 0.5 mg-albuterol 2.5 mg 0.5-2.5 (3) MG/3ML Soln nebulizer solution  Commonly known as: DUONEB  Inhale 3 mLs into the lungs every 6 hours as needed for Shortness of Breath or Wheezing     melatonin 5 MG Tabs tablet  Take 1 tablet by mouth nightly as needed (insomnia)     metoprolol tartrate 50 MG tablet  Commonly known as: LOPRESSOR  Take 1 tablet by mouth in the morning, at noon, and at bedtime            STOP taking these medications      betamethasone dipropionate 0.05 % cream     digoxin 250 MCG tablet  Commonly known as: LANOXIN     Vitamin D3 50 MCG (2000 UT) Caps               Where to Get Your Medications        These medications were sent to [address] [place] [address] [phone] [place] [id number]      Phone: [phone]   phosphorus [phone] MG tablet       Information about where to get these medications is not yet available    Ask your nurse or doctor about these medications  oxyCODONE 5 MG immediate release tablet           Follow up Care: Follow-up Information    None          Diet:  regular diet    Disposition:  Nursing Home. Advanced Directive:   FULL    DNR X     Discharge Exam:  General:  Alert, confused, cachectic   Lungs:   Clear to auscultation bilaterally. Chest wall:  No tenderness or deformity. Heart:  Regular rate and rhythm, S1, S2 normal, no murmur, click, rub or gallop. Abdomen:   Soft, non-tender. Bowel sounds normal. No masses,  No organomegaly. Extremities: Extremities normal, atraumatic, no cyanosis or edema.

## (undated) DEVICE — SYRINGE MED 10CC ECC TIP W/O NDL

## (undated) DEVICE — CANNULA NSL O2 AD 7 FT END-TIDAL CARBON DIOX VENTFLO

## (undated) DEVICE — KIT COLON WITH 1.1 OZ ORCA HYDRA SEAL 2 GOWN ADAPT SECONDARY

## (undated) DEVICE — FORCEPS BX L240CM JAW DIA2.2MM RAD JAW 4 HOT DISP

## (undated) DEVICE — YANKAUER,BULB TIP,W/O VENT,RIGID,STERILE: Brand: MEDLINE

## (undated) DEVICE — Device: Brand: PORTEX

## (undated) DEVICE — SINGLE USE SUCTION VALVE MAJ-209: Brand: SINGLE USE SUCTION VALVE (STERILE)

## (undated) DEVICE — TRAP,MUCUS SPECIMEN, 80CC: Brand: MEDLINE

## (undated) DEVICE — MASK ANES INF SZ 2 PREM TAIL VLV INFL PRT UNSCENTED SGL PT

## (undated) DEVICE — SINGLE USE BIOPSY VALVE MAJ-210: Brand: SINGLE USE BIOPSY VALVE (STERILE)

## (undated) DEVICE — MASK O2 MED AD 7 FT 3 IN 1 W/ STD CONN LTX

## (undated) DEVICE — PROCEDURE KIT BX00717111

## (undated) DEVICE — BOWL MED M 16OZ PLAS CAP GRAD

## (undated) DEVICE — FORCEPS BX 240CM 2.4MM L NDL RAD JAW 4 M00513334

## (undated) DEVICE — SYRINGE 20ML E/T: Brand: SYRINGE 20ML E/T

## (undated) DEVICE — BRUSH CYTO L150CM CHN L2MM BRIST DIA1.9MM PTFE S STL BULL